# Patient Record
Sex: FEMALE | Race: WHITE | Employment: OTHER | URBAN - METROPOLITAN AREA
[De-identification: names, ages, dates, MRNs, and addresses within clinical notes are randomized per-mention and may not be internally consistent; named-entity substitution may affect disease eponyms.]

---

## 2017-04-09 ENCOUNTER — APPOINTMENT (OUTPATIENT)
Dept: GENERAL RADIOLOGY | Age: 67
End: 2017-04-09
Attending: EMERGENCY MEDICINE
Payer: COMMERCIAL

## 2017-04-09 ENCOUNTER — HOSPITAL ENCOUNTER (EMERGENCY)
Age: 67
Discharge: HOME | End: 2017-04-10
Attending: EMERGENCY MEDICINE | Admitting: EMERGENCY MEDICINE
Payer: COMMERCIAL

## 2017-04-09 ENCOUNTER — APPOINTMENT (OUTPATIENT)
Dept: CT IMAGING | Age: 67
End: 2017-04-09
Attending: EMERGENCY MEDICINE
Payer: COMMERCIAL

## 2017-04-09 DIAGNOSIS — R10.13 ABDOMINAL PAIN, EPIGASTRIC: Primary | ICD-10-CM

## 2017-04-09 DIAGNOSIS — K29.70 GASTRITIS WITHOUT BLEEDING, UNSPECIFIED CHRONICITY, UNSPECIFIED GASTRITIS TYPE: ICD-10-CM

## 2017-04-09 LAB
ALBUMIN SERPL BCP-MCNC: 4.4 G/DL (ref 3.5–5)
ALBUMIN/GLOB SERPL: 1.5 {RATIO} (ref 1.1–2.2)
ALP SERPL-CCNC: 34 U/L (ref 45–117)
ALT SERPL-CCNC: 26 U/L (ref 12–78)
AMYLASE SERPL-CCNC: 126 U/L (ref 25–115)
ANION GAP BLD CALC-SCNC: 6 MMOL/L (ref 5–15)
APTT PPP: 22.9 SEC (ref 22.1–32.5)
AST SERPL W P-5'-P-CCNC: 19 U/L (ref 15–37)
BASOPHILS # BLD AUTO: 0 K/UL (ref 0–0.1)
BASOPHILS # BLD: 0 % (ref 0–1)
BILIRUB SERPL-MCNC: 0.2 MG/DL (ref 0.2–1)
BUN SERPL-MCNC: 17 MG/DL (ref 6–20)
BUN/CREAT SERPL: 23 (ref 12–20)
CALCIUM SERPL-MCNC: 9.1 MG/DL (ref 8.5–10.1)
CHLORIDE SERPL-SCNC: 105 MMOL/L (ref 97–108)
CK MB CFR SERPL CALC: 0.7 % (ref 0–2.5)
CK MB SERPL-MCNC: 1.2 NG/ML (ref 5–25)
CK SERPL-CCNC: 164 U/L (ref 26–192)
CO2 SERPL-SCNC: 26 MMOL/L (ref 21–32)
CREAT SERPL-MCNC: 0.73 MG/DL (ref 0.55–1.02)
D DIMER PPP FEU-MCNC: 0.2 MG/L FEU (ref 0–0.65)
EOSINOPHIL # BLD: 0.1 K/UL (ref 0–0.4)
EOSINOPHIL NFR BLD: 1 % (ref 0–7)
ERYTHROCYTE [DISTWIDTH] IN BLOOD BY AUTOMATED COUNT: 14 % (ref 11.5–14.5)
GLOBULIN SER CALC-MCNC: 2.9 G/DL (ref 2–4)
GLUCOSE SERPL-MCNC: 106 MG/DL (ref 65–100)
HCT VFR BLD AUTO: 34.4 % (ref 35–47)
HGB BLD-MCNC: 11.1 G/DL (ref 11.5–16)
INR PPP: 1 (ref 0.9–1.1)
LIPASE SERPL-CCNC: 272 U/L (ref 73–393)
LYMPHOCYTES # BLD AUTO: 28 % (ref 12–49)
LYMPHOCYTES # BLD: 1.6 K/UL (ref 0.8–3.5)
MCH RBC QN AUTO: 31 PG (ref 26–34)
MCHC RBC AUTO-ENTMCNC: 32.3 G/DL (ref 30–36.5)
MCV RBC AUTO: 96.1 FL (ref 80–99)
MONOCYTES # BLD: 0.3 K/UL (ref 0–1)
MONOCYTES NFR BLD AUTO: 5 % (ref 5–13)
NEUTS SEG # BLD: 3.6 K/UL (ref 1.8–8)
NEUTS SEG NFR BLD AUTO: 66 % (ref 32–75)
PLATELET # BLD AUTO: 314 K/UL (ref 150–400)
POTASSIUM SERPL-SCNC: 4.2 MMOL/L (ref 3.5–5.1)
PROT SERPL-MCNC: 7.3 G/DL (ref 6.4–8.2)
PROTHROMBIN TIME: 10.5 SEC (ref 9–11.1)
RBC # BLD AUTO: 3.58 M/UL (ref 3.8–5.2)
SODIUM SERPL-SCNC: 137 MMOL/L (ref 136–145)
THERAPEUTIC RANGE,PTTT: NORMAL SECS (ref 58–77)
TROPONIN I SERPL-MCNC: <0.04 NG/ML
WBC # BLD AUTO: 5.6 K/UL (ref 3.6–11)

## 2017-04-09 PROCEDURE — 96374 THER/PROPH/DIAG INJ IV PUSH: CPT

## 2017-04-09 PROCEDURE — 80053 COMPREHEN METABOLIC PANEL: CPT | Performed by: EMERGENCY MEDICINE

## 2017-04-09 PROCEDURE — 96361 HYDRATE IV INFUSION ADD-ON: CPT

## 2017-04-09 PROCEDURE — 74011636320 HC RX REV CODE- 636/320: Performed by: EMERGENCY MEDICINE

## 2017-04-09 PROCEDURE — 83690 ASSAY OF LIPASE: CPT | Performed by: EMERGENCY MEDICINE

## 2017-04-09 PROCEDURE — 74011250636 HC RX REV CODE- 250/636: Performed by: EMERGENCY MEDICINE

## 2017-04-09 PROCEDURE — 85379 FIBRIN DEGRADATION QUANT: CPT | Performed by: EMERGENCY MEDICINE

## 2017-04-09 PROCEDURE — 96376 TX/PRO/DX INJ SAME DRUG ADON: CPT

## 2017-04-09 PROCEDURE — 74011000258 HC RX REV CODE- 258: Performed by: EMERGENCY MEDICINE

## 2017-04-09 PROCEDURE — 82550 ASSAY OF CK (CPK): CPT | Performed by: EMERGENCY MEDICINE

## 2017-04-09 PROCEDURE — 85025 COMPLETE CBC W/AUTO DIFF WBC: CPT | Performed by: EMERGENCY MEDICINE

## 2017-04-09 PROCEDURE — 36415 COLL VENOUS BLD VENIPUNCTURE: CPT | Performed by: EMERGENCY MEDICINE

## 2017-04-09 PROCEDURE — 93005 ELECTROCARDIOGRAM TRACING: CPT

## 2017-04-09 PROCEDURE — 84484 ASSAY OF TROPONIN QUANT: CPT | Performed by: EMERGENCY MEDICINE

## 2017-04-09 PROCEDURE — 71010 XR CHEST PORT: CPT

## 2017-04-09 PROCEDURE — 85730 THROMBOPLASTIN TIME PARTIAL: CPT | Performed by: EMERGENCY MEDICINE

## 2017-04-09 PROCEDURE — 85610 PROTHROMBIN TIME: CPT | Performed by: EMERGENCY MEDICINE

## 2017-04-09 PROCEDURE — 96375 TX/PRO/DX INJ NEW DRUG ADDON: CPT

## 2017-04-09 PROCEDURE — 99284 EMERGENCY DEPT VISIT MOD MDM: CPT

## 2017-04-09 PROCEDURE — 74177 CT ABD & PELVIS W/CONTRAST: CPT

## 2017-04-09 PROCEDURE — 82150 ASSAY OF AMYLASE: CPT | Performed by: EMERGENCY MEDICINE

## 2017-04-09 RX ORDER — FOLIC ACID 1 MG/1
4 TABLET ORAL DAILY
COMMUNITY

## 2017-04-09 RX ORDER — ONDANSETRON 2 MG/ML
8 INJECTION INTRAMUSCULAR; INTRAVENOUS
Status: COMPLETED | OUTPATIENT
Start: 2017-04-09 | End: 2017-04-09

## 2017-04-09 RX ORDER — HYDROMORPHONE HYDROCHLORIDE 1 MG/ML
1 INJECTION, SOLUTION INTRAMUSCULAR; INTRAVENOUS; SUBCUTANEOUS
Status: COMPLETED | OUTPATIENT
Start: 2017-04-09 | End: 2017-04-09

## 2017-04-09 RX ORDER — ONDANSETRON 2 MG/ML
4 INJECTION INTRAMUSCULAR; INTRAVENOUS
Status: DISCONTINUED | OUTPATIENT
Start: 2017-04-09 | End: 2017-04-09

## 2017-04-09 RX ORDER — SODIUM CHLORIDE 0.9 % (FLUSH) 0.9 %
10 SYRINGE (ML) INJECTION
Status: COMPLETED | OUTPATIENT
Start: 2017-04-09 | End: 2017-04-09

## 2017-04-09 RX ORDER — KETOROLAC TROMETHAMINE 30 MG/ML
30 INJECTION, SOLUTION INTRAMUSCULAR; INTRAVENOUS
Status: COMPLETED | OUTPATIENT
Start: 2017-04-09 | End: 2017-04-09

## 2017-04-09 RX ORDER — METHOTREXATE 2.5 MG/1
TABLET ORAL
COMMUNITY

## 2017-04-09 RX ORDER — HYDROCODONE BITARTRATE AND ACETAMINOPHEN 5; 325 MG/1; MG/1
1 TABLET ORAL
COMMUNITY
End: 2017-04-10

## 2017-04-09 RX ADMIN — ONDANSETRON 8 MG: 2 INJECTION INTRAMUSCULAR; INTRAVENOUS at 23:03

## 2017-04-09 RX ADMIN — Medication 10 ML: at 23:53

## 2017-04-09 RX ADMIN — SODIUM CHLORIDE 100 ML: 900 INJECTION, SOLUTION INTRAVENOUS at 23:53

## 2017-04-09 RX ADMIN — KETOROLAC TROMETHAMINE 30 MG: 30 INJECTION, SOLUTION INTRAMUSCULAR at 23:05

## 2017-04-09 RX ADMIN — IOPAMIDOL 100 ML: 755 INJECTION, SOLUTION INTRAVENOUS at 23:53

## 2017-04-09 RX ADMIN — HYDROMORPHONE HYDROCHLORIDE 1 MG: 1 INJECTION, SOLUTION INTRAMUSCULAR; INTRAVENOUS; SUBCUTANEOUS at 23:02

## 2017-04-09 RX ADMIN — SODIUM CHLORIDE 1000 ML: 900 INJECTION, SOLUTION INTRAVENOUS at 23:06

## 2017-04-10 ENCOUNTER — APPOINTMENT (OUTPATIENT)
Dept: GENERAL RADIOLOGY | Age: 67
End: 2017-04-10
Attending: INTERNAL MEDICINE
Payer: COMMERCIAL

## 2017-04-10 ENCOUNTER — HOSPITAL ENCOUNTER (OUTPATIENT)
Age: 67
Setting detail: OBSERVATION
Discharge: HOME | End: 2017-04-11
Attending: EMERGENCY MEDICINE | Admitting: FAMILY MEDICINE
Payer: COMMERCIAL

## 2017-04-10 ENCOUNTER — ANESTHESIA (OUTPATIENT)
Dept: ENDOSCOPY | Age: 67
End: 2017-04-10
Payer: COMMERCIAL

## 2017-04-10 ENCOUNTER — SURGERY (OUTPATIENT)
Age: 67
End: 2017-04-10

## 2017-04-10 ENCOUNTER — APPOINTMENT (OUTPATIENT)
Dept: ULTRASOUND IMAGING | Age: 67
End: 2017-04-10
Attending: EMERGENCY MEDICINE
Payer: COMMERCIAL

## 2017-04-10 ENCOUNTER — ANESTHESIA EVENT (OUTPATIENT)
Dept: ENDOSCOPY | Age: 67
End: 2017-04-10
Payer: COMMERCIAL

## 2017-04-10 VITALS
WEIGHT: 120 LBS | OXYGEN SATURATION: 99 % | TEMPERATURE: 97.5 F | BODY MASS INDEX: 20.49 KG/M2 | HEIGHT: 64 IN | HEART RATE: 76 BPM | RESPIRATION RATE: 18 BRPM | DIASTOLIC BLOOD PRESSURE: 83 MMHG | SYSTOLIC BLOOD PRESSURE: 129 MMHG

## 2017-04-10 DIAGNOSIS — R10.13 ABDOMINAL PAIN, EPIGASTRIC: Primary | ICD-10-CM

## 2017-04-10 DIAGNOSIS — K25.9 MULTIPLE GASTRIC ULCERS: ICD-10-CM

## 2017-04-10 LAB
ALBUMIN SERPL BCP-MCNC: 4.6 G/DL (ref 3.5–5)
ALBUMIN/GLOB SERPL: 1.5 {RATIO} (ref 1.1–2.2)
ALP SERPL-CCNC: 32 U/L (ref 45–117)
ALT SERPL-CCNC: 24 U/L (ref 12–78)
ANION GAP BLD CALC-SCNC: 7 MMOL/L (ref 5–15)
APPEARANCE UR: CLEAR
AST SERPL W P-5'-P-CCNC: 17 U/L (ref 15–37)
ATRIAL RATE: 61 BPM
ATRIAL RATE: 68 BPM
ATRIAL RATE: 70 BPM
BACTERIA URNS QL MICRO: NEGATIVE /HPF
BASOPHILS # BLD AUTO: 0 K/UL (ref 0–0.1)
BASOPHILS # BLD: 0 % (ref 0–1)
BILIRUB SERPL-MCNC: 0.4 MG/DL (ref 0.2–1)
BILIRUB UR QL: NEGATIVE
BUN SERPL-MCNC: 13 MG/DL (ref 6–20)
BUN/CREAT SERPL: 19 (ref 12–20)
CALCIUM SERPL-MCNC: 9.3 MG/DL (ref 8.5–10.1)
CALCULATED P AXIS, ECG09: 45 DEGREES
CALCULATED P AXIS, ECG09: 52 DEGREES
CALCULATED P AXIS, ECG09: 66 DEGREES
CALCULATED R AXIS, ECG10: 30 DEGREES
CALCULATED R AXIS, ECG10: 44 DEGREES
CALCULATED R AXIS, ECG10: 48 DEGREES
CALCULATED T AXIS, ECG11: 73 DEGREES
CALCULATED T AXIS, ECG11: 82 DEGREES
CALCULATED T AXIS, ECG11: 94 DEGREES
CHLORIDE SERPL-SCNC: 104 MMOL/L (ref 97–108)
CO2 SERPL-SCNC: 25 MMOL/L (ref 21–32)
COLOR UR: ABNORMAL
CREAT SERPL-MCNC: 0.67 MG/DL (ref 0.55–1.02)
DIAGNOSIS, 93000: NORMAL
DIFFERENTIAL METHOD BLD: ABNORMAL
EOSINOPHIL # BLD: 0 K/UL (ref 0–0.4)
EOSINOPHIL NFR BLD: 0 % (ref 0–7)
EPITH CASTS URNS QL MICRO: ABNORMAL /LPF
ERYTHROCYTE [DISTWIDTH] IN BLOOD BY AUTOMATED COUNT: 14 % (ref 11.5–14.5)
GLOBULIN SER CALC-MCNC: 3 G/DL (ref 2–4)
GLUCOSE SERPL-MCNC: 112 MG/DL (ref 65–100)
GLUCOSE UR STRIP.AUTO-MCNC: NEGATIVE MG/DL
HCT VFR BLD AUTO: 34.1 % (ref 35–47)
HGB BLD-MCNC: 11.1 G/DL (ref 11.5–16)
HGB UR QL STRIP: ABNORMAL
HYALINE CASTS URNS QL MICRO: ABNORMAL /LPF (ref 0–5)
KETONES UR QL STRIP.AUTO: ABNORMAL MG/DL
LEUKOCYTE ESTERASE UR QL STRIP.AUTO: NEGATIVE
LIPASE SERPL-CCNC: 116 U/L (ref 73–393)
LYMPHOCYTES # BLD AUTO: 9 % (ref 12–49)
LYMPHOCYTES # BLD: 0.6 K/UL (ref 0.8–3.5)
MCH RBC QN AUTO: 30.8 PG (ref 26–34)
MCHC RBC AUTO-ENTMCNC: 32.6 G/DL (ref 30–36.5)
MCV RBC AUTO: 94.7 FL (ref 80–99)
MONOCYTES # BLD: 0.5 K/UL (ref 0–1)
MONOCYTES NFR BLD AUTO: 7 % (ref 5–13)
NEUTS SEG # BLD: 5.7 K/UL (ref 1.8–8)
NEUTS SEG NFR BLD AUTO: 84 % (ref 32–75)
NITRITE UR QL STRIP.AUTO: NEGATIVE
P-R INTERVAL, ECG05: 122 MS
P-R INTERVAL, ECG05: 140 MS
P-R INTERVAL, ECG05: 146 MS
PH UR STRIP: 7.5 [PH] (ref 5–8)
PLATELET # BLD AUTO: 310 K/UL (ref 150–400)
POTASSIUM SERPL-SCNC: 4.1 MMOL/L (ref 3.5–5.1)
PROT SERPL-MCNC: 7.6 G/DL (ref 6.4–8.2)
PROT UR STRIP-MCNC: NEGATIVE MG/DL
Q-T INTERVAL, ECG07: 428 MS
Q-T INTERVAL, ECG07: 444 MS
Q-T INTERVAL, ECG07: 466 MS
QRS DURATION, ECG06: 100 MS
QRS DURATION, ECG06: 90 MS
QRS DURATION, ECG06: 94 MS
QTC CALCULATION (BEZET), ECG08: 462 MS
QTC CALCULATION (BEZET), ECG08: 469 MS
QTC CALCULATION (BEZET), ECG08: 472 MS
RBC # BLD AUTO: 3.6 M/UL (ref 3.8–5.2)
RBC #/AREA URNS HPF: ABNORMAL /HPF (ref 0–5)
RBC MORPH BLD: ABNORMAL
RBC MORPH BLD: ABNORMAL
SODIUM SERPL-SCNC: 136 MMOL/L (ref 136–145)
SP GR UR REFRACTOMETRY: 1.03 (ref 1–1.03)
TROPONIN I BLD-MCNC: <0.04 NG/ML (ref 0–0.08)
UROBILINOGEN UR QL STRIP.AUTO: 0.2 EU/DL (ref 0.2–1)
VENTRICULAR RATE, ECG03: 61 BPM
VENTRICULAR RATE, ECG03: 68 BPM
VENTRICULAR RATE, ECG03: 70 BPM
WBC # BLD AUTO: 6.8 K/UL (ref 3.6–11)
WBC URNS QL MICRO: ABNORMAL /HPF (ref 0–4)

## 2017-04-10 PROCEDURE — 74011000250 HC RX REV CODE- 250

## 2017-04-10 PROCEDURE — 74011250636 HC RX REV CODE- 250/636: Performed by: EMERGENCY MEDICINE

## 2017-04-10 PROCEDURE — 76705 ECHO EXAM OF ABDOMEN: CPT

## 2017-04-10 PROCEDURE — 96374 THER/PROPH/DIAG INJ IV PUSH: CPT

## 2017-04-10 PROCEDURE — 83690 ASSAY OF LIPASE: CPT | Performed by: EMERGENCY MEDICINE

## 2017-04-10 PROCEDURE — 99218 HC RM OBSERVATION: CPT

## 2017-04-10 PROCEDURE — 74011000250 HC RX REV CODE- 250: Performed by: NURSE PRACTITIONER

## 2017-04-10 PROCEDURE — 88305 TISSUE EXAM BY PATHOLOGIST: CPT | Performed by: INTERNAL MEDICINE

## 2017-04-10 PROCEDURE — 93005 ELECTROCARDIOGRAM TRACING: CPT

## 2017-04-10 PROCEDURE — 74011250637 HC RX REV CODE- 250/637: Performed by: NURSE PRACTITIONER

## 2017-04-10 PROCEDURE — 74000 XR ABD (KUB): CPT

## 2017-04-10 PROCEDURE — 74011250636 HC RX REV CODE- 250/636: Performed by: NURSE PRACTITIONER

## 2017-04-10 PROCEDURE — 76060000031 HC ANESTHESIA FIRST 0.5 HR: Performed by: INTERNAL MEDICINE

## 2017-04-10 PROCEDURE — C9113 INJ PANTOPRAZOLE SODIUM, VIA: HCPCS | Performed by: EMERGENCY MEDICINE

## 2017-04-10 PROCEDURE — 80053 COMPREHEN METABOLIC PANEL: CPT | Performed by: EMERGENCY MEDICINE

## 2017-04-10 PROCEDURE — 99284 EMERGENCY DEPT VISIT MOD MDM: CPT

## 2017-04-10 PROCEDURE — C9113 INJ PANTOPRAZOLE SODIUM, VIA: HCPCS | Performed by: NURSE PRACTITIONER

## 2017-04-10 PROCEDURE — 85025 COMPLETE CBC W/AUTO DIFF WBC: CPT | Performed by: EMERGENCY MEDICINE

## 2017-04-10 PROCEDURE — 84484 ASSAY OF TROPONIN QUANT: CPT

## 2017-04-10 PROCEDURE — 96376 TX/PRO/DX INJ SAME DRUG ADON: CPT

## 2017-04-10 PROCEDURE — 96375 TX/PRO/DX INJ NEW DRUG ADDON: CPT

## 2017-04-10 PROCEDURE — 74011000250 HC RX REV CODE- 250: Performed by: EMERGENCY MEDICINE

## 2017-04-10 PROCEDURE — 96361 HYDRATE IV INFUSION ADD-ON: CPT

## 2017-04-10 PROCEDURE — 77030009426 HC FCPS BIOP ENDOSC BSC -B: Performed by: INTERNAL MEDICINE

## 2017-04-10 PROCEDURE — 74011250636 HC RX REV CODE- 250/636

## 2017-04-10 PROCEDURE — 81001 URINALYSIS AUTO W/SCOPE: CPT | Performed by: EMERGENCY MEDICINE

## 2017-04-10 PROCEDURE — 76040000019: Performed by: INTERNAL MEDICINE

## 2017-04-10 PROCEDURE — 74011250637 HC RX REV CODE- 250/637: Performed by: EMERGENCY MEDICINE

## 2017-04-10 PROCEDURE — 36415 COLL VENOUS BLD VENIPUNCTURE: CPT | Performed by: EMERGENCY MEDICINE

## 2017-04-10 RX ORDER — SODIUM CHLORIDE 9 MG/ML
50 INJECTION, SOLUTION INTRAVENOUS CONTINUOUS
Status: DISCONTINUED | OUTPATIENT
Start: 2017-04-10 | End: 2017-04-10

## 2017-04-10 RX ORDER — ATROPINE SULFATE 0.1 MG/ML
0.5 INJECTION INTRAVENOUS
Status: DISCONTINUED | OUTPATIENT
Start: 2017-04-10 | End: 2017-04-10 | Stop reason: HOSPADM

## 2017-04-10 RX ORDER — FOLIC ACID 1 MG/1
4 TABLET ORAL DAILY
Status: DISCONTINUED | OUTPATIENT
Start: 2017-04-11 | End: 2017-04-11 | Stop reason: HOSPADM

## 2017-04-10 RX ORDER — LIDOCAINE HYDROCHLORIDE 20 MG/ML
INJECTION, SOLUTION EPIDURAL; INFILTRATION; INTRACAUDAL; PERINEURAL AS NEEDED
Status: DISCONTINUED | OUTPATIENT
Start: 2017-04-10 | End: 2017-04-10 | Stop reason: HOSPADM

## 2017-04-10 RX ORDER — CARBOXYMETHYLCELLULOSE SODIUM 5 MG/ML
1 SOLUTION/ DROPS OPHTHALMIC AS NEEDED
COMMUNITY

## 2017-04-10 RX ORDER — SODIUM CHLORIDE 0.9 % (FLUSH) 0.9 %
5-10 SYRINGE (ML) INJECTION AS NEEDED
Status: DISPENSED | OUTPATIENT
Start: 2017-04-10 | End: 2017-04-10

## 2017-04-10 RX ORDER — SODIUM CHLORIDE 0.9 % (FLUSH) 0.9 %
5-10 SYRINGE (ML) INJECTION AS NEEDED
Status: DISCONTINUED | OUTPATIENT
Start: 2017-04-10 | End: 2017-04-11 | Stop reason: HOSPADM

## 2017-04-10 RX ORDER — HYDROMORPHONE HYDROCHLORIDE 1 MG/ML
INJECTION, SOLUTION INTRAMUSCULAR; INTRAVENOUS; SUBCUTANEOUS
Status: COMPLETED
Start: 2017-04-10 | End: 2017-04-10

## 2017-04-10 RX ORDER — BUTALBITAL, ACETAMINOPHEN, CAFFEINE AND CODEINE PHOSPHATE 50; 325; 40; 30 MG/1; MG/1; MG/1; MG/1
1-2 CAPSULE ORAL
COMMUNITY

## 2017-04-10 RX ORDER — SUCRALFATE 1 G/1
1 TABLET ORAL
Status: DISCONTINUED | OUTPATIENT
Start: 2017-04-10 | End: 2017-04-11 | Stop reason: HOSPADM

## 2017-04-10 RX ORDER — HYDROMORPHONE HYDROCHLORIDE 1 MG/ML
1 INJECTION, SOLUTION INTRAMUSCULAR; INTRAVENOUS; SUBCUTANEOUS
Status: COMPLETED | OUTPATIENT
Start: 2017-04-10 | End: 2017-04-10

## 2017-04-10 RX ORDER — NALOXONE HYDROCHLORIDE 0.4 MG/ML
0.4 INJECTION, SOLUTION INTRAMUSCULAR; INTRAVENOUS; SUBCUTANEOUS
Status: DISCONTINUED | OUTPATIENT
Start: 2017-04-10 | End: 2017-04-10 | Stop reason: HOSPADM

## 2017-04-10 RX ORDER — HYDROMORPHONE HYDROCHLORIDE 5 MG/5ML
1 SOLUTION ORAL
Status: DISCONTINUED | OUTPATIENT
Start: 2017-04-10 | End: 2017-04-10

## 2017-04-10 RX ORDER — ETODOLAC 500 MG/1
500 TABLET, FILM COATED ORAL 2 TIMES DAILY
COMMUNITY
End: 2017-04-11

## 2017-04-10 RX ORDER — MIDAZOLAM HYDROCHLORIDE 1 MG/ML
.25-5 INJECTION, SOLUTION INTRAMUSCULAR; INTRAVENOUS
Status: DISCONTINUED | OUTPATIENT
Start: 2017-04-10 | End: 2017-04-10 | Stop reason: HOSPADM

## 2017-04-10 RX ORDER — EPINEPHRINE 0.1 MG/ML
1 INJECTION INTRACARDIAC; INTRAVENOUS
Status: DISCONTINUED | OUTPATIENT
Start: 2017-04-10 | End: 2017-04-10 | Stop reason: HOSPADM

## 2017-04-10 RX ORDER — FENTANYL CITRATE 50 UG/ML
100 INJECTION, SOLUTION INTRAMUSCULAR; INTRAVENOUS
Status: DISCONTINUED | OUTPATIENT
Start: 2017-04-10 | End: 2017-04-10 | Stop reason: HOSPADM

## 2017-04-10 RX ORDER — CETIRIZINE HCL 10 MG
10 TABLET ORAL
COMMUNITY

## 2017-04-10 RX ORDER — OXYCODONE AND ACETAMINOPHEN 7.5; 325 MG/1; MG/1
1 TABLET ORAL
COMMUNITY

## 2017-04-10 RX ORDER — PROPOFOL 10 MG/ML
INJECTION, EMULSION INTRAVENOUS AS NEEDED
Status: DISCONTINUED | OUTPATIENT
Start: 2017-04-10 | End: 2017-04-10 | Stop reason: HOSPADM

## 2017-04-10 RX ORDER — SODIUM CHLORIDE 0.9 % (FLUSH) 0.9 %
5-10 SYRINGE (ML) INJECTION EVERY 8 HOURS
Status: DISCONTINUED | OUTPATIENT
Start: 2017-04-10 | End: 2017-04-10

## 2017-04-10 RX ORDER — CYCLOBENZAPRINE HCL 10 MG
10 TABLET ORAL 2 TIMES DAILY
Status: DISCONTINUED | OUTPATIENT
Start: 2017-04-10 | End: 2017-04-11 | Stop reason: HOSPADM

## 2017-04-10 RX ORDER — ONDANSETRON 2 MG/ML
4 INJECTION INTRAMUSCULAR; INTRAVENOUS
Status: DISCONTINUED | OUTPATIENT
Start: 2017-04-10 | End: 2017-04-11

## 2017-04-10 RX ORDER — SODIUM CHLORIDE 9 MG/ML
INJECTION, SOLUTION INTRAVENOUS
Status: DISCONTINUED | OUTPATIENT
Start: 2017-04-10 | End: 2017-04-10 | Stop reason: HOSPADM

## 2017-04-10 RX ORDER — FLUOROURACIL 50 MG/G
CREAM TOPICAL 2 TIMES DAILY
COMMUNITY

## 2017-04-10 RX ORDER — DEXTROMETHORPHAN/PSEUDOEPHED 2.5-7.5/.8
1.2 DROPS ORAL
Status: DISCONTINUED | OUTPATIENT
Start: 2017-04-10 | End: 2017-04-10 | Stop reason: HOSPADM

## 2017-04-10 RX ORDER — HYDROMORPHONE HYDROCHLORIDE 1 MG/ML
1 INJECTION, SOLUTION INTRAMUSCULAR; INTRAVENOUS; SUBCUTANEOUS ONCE
Status: COMPLETED | OUTPATIENT
Start: 2017-04-10 | End: 2017-04-10

## 2017-04-10 RX ORDER — SODIUM CHLORIDE 0.9 % (FLUSH) 0.9 %
5-10 SYRINGE (ML) INJECTION EVERY 8 HOURS
Status: DISCONTINUED | OUTPATIENT
Start: 2017-04-10 | End: 2017-04-11 | Stop reason: HOSPADM

## 2017-04-10 RX ORDER — CYCLOBENZAPRINE HCL 10 MG
10 TABLET ORAL 2 TIMES DAILY
COMMUNITY

## 2017-04-10 RX ORDER — ACETAMINOPHEN 500 MG
500 TABLET ORAL
Status: DISCONTINUED | OUTPATIENT
Start: 2017-04-10 | End: 2017-04-11 | Stop reason: HOSPADM

## 2017-04-10 RX ORDER — OXYCODONE AND ACETAMINOPHEN 5; 325 MG/1; MG/1
2 TABLET ORAL
Qty: 10 TAB | Refills: 0 | Status: SHIPPED | OUTPATIENT
Start: 2017-04-10 | End: 2017-04-10

## 2017-04-10 RX ORDER — ACETAMINOPHEN 500 MG
500 TABLET ORAL
COMMUNITY

## 2017-04-10 RX ORDER — PANTOPRAZOLE SODIUM 40 MG/10ML
40 INJECTION, POWDER, LYOPHILIZED, FOR SOLUTION INTRAVENOUS
Status: COMPLETED | OUTPATIENT
Start: 2017-04-10 | End: 2017-04-10

## 2017-04-10 RX ORDER — OMEPRAZOLE 20 MG/1
20 CAPSULE, DELAYED RELEASE ORAL DAILY
Qty: 30 CAP | Refills: 0 | Status: SHIPPED | OUTPATIENT
Start: 2017-04-10 | End: 2017-04-10

## 2017-04-10 RX ORDER — CETIRIZINE HCL 10 MG
10 TABLET ORAL
Status: DISCONTINUED | OUTPATIENT
Start: 2017-04-10 | End: 2017-04-11 | Stop reason: HOSPADM

## 2017-04-10 RX ORDER — ONDANSETRON 8 MG/1
8 TABLET, ORALLY DISINTEGRATING ORAL
Qty: 15 TAB | Refills: 0 | Status: SHIPPED | OUTPATIENT
Start: 2017-04-10 | End: 2017-04-10

## 2017-04-10 RX ORDER — HYDROMORPHONE HYDROCHLORIDE 1 MG/ML
1 INJECTION, SOLUTION INTRAMUSCULAR; INTRAVENOUS; SUBCUTANEOUS
Status: DISCONTINUED | OUTPATIENT
Start: 2017-04-10 | End: 2017-04-11

## 2017-04-10 RX ORDER — BUTALBITAL, ACETAMINOPHEN AND CAFFEINE 50; 325; 40 MG/1; MG/1; MG/1
1 TABLET ORAL
Status: DISCONTINUED | OUTPATIENT
Start: 2017-04-10 | End: 2017-04-11 | Stop reason: HOSPADM

## 2017-04-10 RX ORDER — HYDROMORPHONE HYDROCHLORIDE 1 MG/ML
1 INJECTION, SOLUTION INTRAMUSCULAR; INTRAVENOUS; SUBCUTANEOUS
Status: DISCONTINUED | OUTPATIENT
Start: 2017-04-10 | End: 2017-04-10

## 2017-04-10 RX ORDER — ONDANSETRON 2 MG/ML
4 INJECTION INTRAMUSCULAR; INTRAVENOUS
Status: COMPLETED | OUTPATIENT
Start: 2017-04-10 | End: 2017-04-10

## 2017-04-10 RX ORDER — SODIUM CHLORIDE 9 MG/ML
125 INJECTION, SOLUTION INTRAVENOUS CONTINUOUS
Status: DISCONTINUED | OUTPATIENT
Start: 2017-04-10 | End: 2017-04-11

## 2017-04-10 RX ORDER — FLUMAZENIL 0.1 MG/ML
0.2 INJECTION INTRAVENOUS
Status: DISCONTINUED | OUTPATIENT
Start: 2017-04-10 | End: 2017-04-10 | Stop reason: HOSPADM

## 2017-04-10 RX ORDER — LORAZEPAM 1 MG/1
1-2 TABLET ORAL
Status: DISCONTINUED | OUTPATIENT
Start: 2017-04-10 | End: 2017-04-11 | Stop reason: HOSPADM

## 2017-04-10 RX ORDER — LORAZEPAM 1 MG/1
1-2 TABLET ORAL
COMMUNITY

## 2017-04-10 RX ADMIN — LIDOCAINE HYDROCHLORIDE 40 MG: 20 INJECTION, SOLUTION EPIDURAL; INFILTRATION; INTRACAUDAL; PERINEURAL at 13:37

## 2017-04-10 RX ADMIN — HYDROMORPHONE HYDROCHLORIDE 1 MG: 1 INJECTION, SOLUTION INTRAMUSCULAR; INTRAVENOUS; SUBCUTANEOUS at 12:39

## 2017-04-10 RX ADMIN — HYDROMORPHONE HYDROCHLORIDE 1 MG: 1 INJECTION, SOLUTION INTRAMUSCULAR; INTRAVENOUS; SUBCUTANEOUS at 15:19

## 2017-04-10 RX ADMIN — PROPOFOL 50 MG: 10 INJECTION, EMULSION INTRAVENOUS at 13:43

## 2017-04-10 RX ADMIN — Medication 1 CAPSULE: at 21:14

## 2017-04-10 RX ADMIN — HYDROMORPHONE HYDROCHLORIDE 1 MG: 1 INJECTION, SOLUTION INTRAMUSCULAR; INTRAVENOUS; SUBCUTANEOUS at 01:50

## 2017-04-10 RX ADMIN — Medication 10 ML: at 21:21

## 2017-04-10 RX ADMIN — SUCRALFATE 1 G: 1 TABLET ORAL at 21:14

## 2017-04-10 RX ADMIN — SODIUM CHLORIDE 125 ML/HR: 900 INJECTION, SOLUTION INTRAVENOUS at 18:00

## 2017-04-10 RX ADMIN — SODIUM CHLORIDE 40 MG: 9 INJECTION INTRAMUSCULAR; INTRAVENOUS; SUBCUTANEOUS at 21:14

## 2017-04-10 RX ADMIN — LIDOCAINE HYDROCHLORIDE 40 ML: 20 SOLUTION ORAL; TOPICAL at 01:17

## 2017-04-10 RX ADMIN — HYDROMORPHONE HYDROCHLORIDE 1 MG: 1 INJECTION, SOLUTION INTRAMUSCULAR; INTRAVENOUS; SUBCUTANEOUS at 20:36

## 2017-04-10 RX ADMIN — SODIUM CHLORIDE 1000 ML: 900 INJECTION, SOLUTION INTRAVENOUS at 12:40

## 2017-04-10 RX ADMIN — PANTOPRAZOLE SODIUM 40 MG: 40 INJECTION, POWDER, FOR SOLUTION INTRAVENOUS at 15:15

## 2017-04-10 RX ADMIN — LORAZEPAM 1 MG: 1 TABLET ORAL at 21:14

## 2017-04-10 RX ADMIN — PROPOFOL 50 MG: 10 INJECTION, EMULSION INTRAVENOUS at 13:40

## 2017-04-10 RX ADMIN — ONDANSETRON 4 MG: 2 INJECTION INTRAMUSCULAR; INTRAVENOUS at 12:33

## 2017-04-10 RX ADMIN — PROPOFOL 50 MG: 10 INJECTION, EMULSION INTRAVENOUS at 13:37

## 2017-04-10 RX ADMIN — SODIUM CHLORIDE: 9 INJECTION, SOLUTION INTRAVENOUS at 13:27

## 2017-04-10 RX ADMIN — Medication 10 ML: at 18:01

## 2017-04-10 RX ADMIN — ONDANSETRON 4 MG: 2 INJECTION INTRAMUSCULAR; INTRAVENOUS at 15:15

## 2017-04-10 NOTE — ED NOTES
Discharge instructions given to pt. All questions answered and pt verbalized understanding. V/S stable @ time of discharge. Pt out of unit via wheelchair.

## 2017-04-10 NOTE — DISCHARGE INSTRUCTIONS
We hope that we have addressed all of your medical concerns. The examination and treatment you received in the Emergency Department were for an emergent problem and were not intended as complete care. It is important that you follow up with your healthcare provider(s) for ongoing care. If your symptoms worsen or do not improve as expected, and you are unable to reach your usual health care provider(s), you should return to the Emergency Department. Today's healthcare is undergoing tremendous change, and patient satisfaction surveys are one of the many tools to assess the quality of medical care. You may receive a survey from the [employer] regarding your experience in the Emergency Department. I hope that your experience has been completely positive, particularly the medical care that I provided. As such, please participate in the survey; anything less than excellent does not meet my expectations or intentions. [facility] and [address] [place] [address] participate in nationally recognized quality of care measures. If your blood pressure is greater than 120/80, as reported below, we urge that you seek medical care to address the potential of high blood pressure, commonly known as hypertension. Hypertension can be hereditary or can be caused by certain medical conditions, pain, stress, or \"white coat syndrome. \"       Please make an appointment with your health care provider(s) for follow up of your Emergency Department visit. VITALS:   Patient Vitals for the past 8 hrs:   Temp Pulse Resp BP SpO2   04/10/17 0120 - 78 18 153/72 100 %   04/09/17 2321 - 63 20 (!) 152/109 100 %   04/09/17 2205 97.7 °F (36.5 °C) 76 18 (!) 159/94 99 %          Thank you for allowing us to provide you with medical care today. We realize that you have many choices for your emergency care needs. Please choose us in the future for any continued health care needs. Brijesh Quiroz MD    [address] [facility].   Office: [phone]            Recent Results (from the past 24 hour(s))   CBC WITH AUTOMATED DIFF    Collection Time: 04/09/17 10:27 PM   Result Value Ref Range    WBC 5.6 3.6 - 11.0 K/uL    RBC 3.58 (L) 3.80 - 5.20 M/uL    HGB 11.1 (L) 11.5 - 16.0 g/dL    HCT 34.4 (L) 35.0 - 47.0 %    MCV 96.1 80.0 - 99.0 FL    MCH 31.0 26.0 - 34.0 PG    MCHC 32.3 30.0 - 36.5 g/dL    RDW 14.0 11.5 - 14.5 %    PLATELET 374 397 - 201 K/uL    NEUTROPHILS 66 32 - 75 %    LYMPHOCYTES 28 12 - 49 %    MONOCYTES 5 5 - 13 %    EOSINOPHILS 1 0 - 7 %    BASOPHILS 0 0 - 1 %    ABS. NEUTROPHILS 3.6 1.8 - 8.0 K/UL    ABS. LYMPHOCYTES 1.6 0.8 - 3.5 K/UL    ABS. MONOCYTES 0.3 0.0 - 1.0 K/UL    ABS. EOSINOPHILS 0.1 0.0 - 0.4 K/UL    ABS. BASOPHILS 0.0 0.0 - 0.1 K/UL   METABOLIC PANEL, COMPREHENSIVE    Collection Time: 04/09/17 10:27 PM   Result Value Ref Range    Sodium 137 136 - 145 mmol/L    Potassium 4.2 3.5 - 5.1 mmol/L    Chloride 105 97 - 108 mmol/L    CO2 26 21 - 32 mmol/L    Anion gap 6 5 - 15 mmol/L    Glucose 106 (H) 65 - 100 mg/dL    BUN 17 6 - 20 MG/DL    Creatinine 0.73 0.55 - 1.02 MG/DL    BUN/Creatinine ratio 23 (H) 12 - 20      GFR est AA >60 >60 ml/min/1.73m2    GFR est non-AA >60 >60 ml/min/1.73m2    Calcium 9.1 8.5 - 10.1 MG/DL    Bilirubin, total 0.2 0.2 - 1.0 MG/DL    ALT (SGPT) 26 12 - 78 U/L    AST (SGOT) 19 15 - 37 U/L    Alk.  phosphatase 34 (L) 45 - 117 U/L    Protein, total 7.3 6.4 - 8.2 g/dL    Albumin 4.4 3.5 - 5.0 g/dL    Globulin 2.9 2.0 - 4.0 g/dL    A-G Ratio 1.5 1.1 - 2.2     LIPASE    Collection Time: 04/09/17 10:27 PM   Result Value Ref Range    Lipase 272 73 - 393 U/L   AMYLASE    Collection Time: 04/09/17 10:27 PM   Result Value Ref Range    Amylase 126 (H) 25 - 115 U/L   CK W/ CKMB & INDEX    Collection Time: 04/09/17 10:27 PM   Result Value Ref Range     26 - 192 U/L    CK - MB 1.2 <3.6 NG/ML    CK-MB Index 0.7 0 - 2.5 D DIMER    Collection Time: 04/09/17 10:27 PM   Result Value Ref Range    D-dimer 0.20 0.00 - 0.65 mg/L FEU   PROTHROMBIN TIME + INR    Collection Time: 04/09/17 10:27 PM   Result Value Ref Range    INR 1.0 0.9 - 1.1      Prothrombin time 10.5 9.0 - 11.1 sec   PTT    Collection Time: 04/09/17 10:27 PM   Result Value Ref Range    aPTT 22.9 22.1 - 32.5 sec    aPTT, therapeutic range     58.0 - 77.0 SECS   TROPONIN I    Collection Time: 04/09/17 10:27 PM   Result Value Ref Range    Troponin-I, Qt. <0.04 <0.05 ng/mL   EKG, 12 LEAD, INITIAL    Collection Time: 04/09/17 11:02 PM   Result Value Ref Range    Ventricular Rate 61 BPM    Atrial Rate 61 BPM    P-R Interval 122 ms    QRS Duration 94 ms    Q-T Interval 466 ms    QTC Calculation (Bezet) 469 ms    Calculated P Axis 66 degrees    Calculated R Axis 48 degrees    Calculated T Axis 82 degrees    Diagnosis       Normal sinus rhythm with sinus arrhythmia  ST & T wave abnormality, consider anterolateral ischemia  No previous ECGs available     URINALYSIS W/ RFLX MICROSCOPIC    Collection Time: 04/10/17  1:22 AM   Result Value Ref Range    Color YELLOW/STRAW      Appearance CLEAR CLEAR      Specific gravity 1.028 1.003 - 1.030      pH (UA) 7.5 5.0 - 8.0      Protein NEGATIVE  NEG mg/dL    Glucose NEGATIVE  NEG mg/dL    Ketone TRACE (A) NEG mg/dL    Bilirubin NEGATIVE  NEG      Blood MODERATE (A) NEG      Urobilinogen 0.2 0.2 - 1.0 EU/dL    Nitrites NEGATIVE  NEG      Leukocyte Esterase NEGATIVE  NEG      WBC 0-4 0 - 4 /hpf    RBC 20-50 0 - 5 /hpf    Epithelial cells FEW FEW /lpf    Bacteria NEGATIVE  NEG /hpf    Hyaline cast 0-2 0 - 5 /lpf   EKG, 12 LEAD, INITIAL    Collection Time: 04/10/17  1:45 AM   Result Value Ref Range    Ventricular Rate 68 BPM    Atrial Rate 68 BPM    P-R Interval 140 ms    QRS Duration 90 ms    Q-T Interval 444 ms    QTC Calculation (Bezet) 472 ms    Calculated P Axis 52 degrees    Calculated R Axis 44 degrees    Calculated T Axis 73 degrees Diagnosis       Normal sinus rhythm  ST & T wave abnormality, consider anterior ischemia  When compared with ECG of 09-APR-2017 23:02,  MANUAL COMPARISON REQUIRED, DATA IS UNCONFIRMED     POC TROPONIN-I    Collection Time: 04/10/17  2:02 AM   Result Value Ref Range    Troponin-I (POC) <0.04 0.00 - 0.08 ng/mL       Ct Abd Pelv W Cont    Result Date: 4/10/2017  EXAM:  CT abdomen pelvis with contrast INDICATION: Upper abdominal pain for 2 hours. COMPARISON: CT 8/1/2007. TECHNIQUE: Helical CT of the abdomen  and pelvis  following the uneventful intravenous administration of nonionic contrast.  Coronal and sagittal reformats are performed. CT dose reduction was achieved through use of a standardized protocol tailored for this examination and automatic exposure control for dose modulation. Adaptive statistical iterative reconstruction (ASIR) was utilized. FINDINGS: The visualized lung bases demonstrate no mass or consolidation. There is a stable benign 3 mm nodule in the left lower lobe. The heart size is normal. There is no pericardial or pleural effusion. The liver, spleen, pancreas, and adrenal glands are normal. The kidneys are symmetric without hydronephrosis. The gall bladder is present  without intra- or extra-hepatic biliary dilatation. There is distal gastric wall thickening with mild adjacent fluid and fat stranding. There are multiple fluid-filled but nondilated small bowel loops. There are no dilated bowel loops. The appendix is not well seen. There are no enlarged lymph nodes. There is no free air. The aorta is tortuous but tapers without aneurysm. The urinary bladder is normal.  There is no pelvic mass. The bony structures are age-appropriate. IMPRESSION: Findings suggestive of gastroenteritis likely attributable to nonspecific infection or inflammation. There is no bowel obstruction. [facility]    Result Date: 4/10/2017  EXAM:  US ABD LTD INDICATION: Epigastric pain.  COMPARISON:  CT 4/9/2017 TECHNIQUE:  Limited abdominal ultrasound. FINDINGS: Liver: Echogenicity is within normal limits. No focal liver lesion. Main portal vein flow: Toward the liver. Fluid: No ascites. Gallbladder: There are several adherent nonshadowing structures along the gallbladder wall measuring up to 3 mm. No gallbladder wall thickening or pericholecystic fluid. Negative sonographic Lynn sign. Bile ducts: There is no intra or extrahepatic biliary ductal dilatation. The common bile duct measures 6 mm. Pancreas: The visualized portions are within normal limits. Kidneys: Right length: 10.3 cm. No hydronephrosis. IMPRESSION: 1. Findings of several adherent gallstones versus polyps measuring up to 3 mm. There is no biliary duct dilatation. 2. No other acute abnormality is seen in the right upper abdomen. Xr Chest Port    Result Date: 4/9/2017  EXAM:  XR CHEST PORT INDICATION:  Abdominal pain for 2 hours. Chest pain. COMPARISON: None TECHNIQUE: Portable AP upright chest view at 2321 hours FINDINGS: Cardiac monitoring wires overlie the thorax. The cardiomediastinal and hilar contours are within normal limits. The pulmonary vasculature is within normal limits. The lungs and pleural spaces are clear. There is levoconvex curvature of the thoracic spine. The upper abdomen is unremarkable. IMPRESSION: No acute process. Abdominal Pain: Care Instructions  Your Care Instructions    Abdominal pain has many possible causes. Some aren't serious and get better on their own in a few days. Others need more testing and treatment. If your pain continues or gets worse, you need to be rechecked and may need more tests to find out what is wrong. You may need surgery to correct the problem. Don't ignore new symptoms, such as fever, nausea and vomiting, urination problems, pain that gets worse, and dizziness. These may be signs of a more serious problem. Your doctor may have recommended a follow-up visit in the next 8 to 12 hours. If you are not getting better, you may need more tests or treatment. The doctor has checked you carefully, but problems can develop later. If you notice any problems or new symptoms, get medical treatment right away. Follow-up care is a key part of your treatment and safety. Be sure to make and go to all appointments, and call your doctor if you are having problems. It's also a good idea to know your test results and keep a list of the medicines you take. How can you care for yourself at home? · Rest until you feel better. · To prevent dehydration, drink plenty of fluids, enough so that your urine is light yellow or clear like water. Choose water and other caffeine-free clear liquids until you feel better. If you have kidney, heart, or liver disease and have to limit fluids, talk with your doctor before you increase the amount of fluids you drink. · If your stomach is upset, eat mild foods, such as rice, dry toast or crackers, bananas, and applesauce. Try eating several small meals instead of two or three large ones. · Wait until 48 hours after all symptoms have gone away before you have spicy foods, alcohol, and drinks that contain caffeine. · Do not eat foods that are high in fat. · Avoid anti-inflammatory medicines such as aspirin, ibuprofen (Advil, Motrin), and naproxen (Aleve). These can cause stomach upset. Talk to your doctor if you take daily aspirin for another health problem. When should you call for help? Call 911 anytime you think you may need emergency care. For example, call if:  · You passed out (lost consciousness). · You pass maroon or very bloody stools. · You vomit blood or what looks like coffee grounds. · You have new, severe belly pain. Call your doctor now or seek immediate medical care if:  · Your pain gets worse, especially if it becomes focused in one area of your belly. · You have a new or higher fever.   · Your stools are black and look like tar, or they have streaks of blood.  · You have unexpected vaginal bleeding. · You have symptoms of a urinary tract infection. These may include:  ¨ Pain when you urinate. ¨ Urinating more often than usual.  ¨ Blood in your urine. · You are dizzy or lightheaded, or you feel like you may faint. Watch closely for changes in your health, and be sure to contact your doctor if:  · You are not getting better after 1 day (24 hours). Where can you learn more? Go to http://lina-zain.info/. Enter T143 in the search box to learn more about \"Abdominal Pain: Care Instructions. \"  Current as of: May 27, 2016  Content Version: 11.2  © 0385-3487 [employer]. Care instructions adapted under license by [employer] (which disclaims liability or warranty for this information). If you have questions about a medical condition or this instruction, always ask your healthcare professional. [place] [address] any warranty or liability for your use of this information. Gastroesophageal Reflux Disease (GERD): Care Instructions  Your Care Instructions    Gastroesophageal reflux disease (GERD) is the backward flow of stomach acid into the esophagus. The esophagus is the tube that leads from your throat to your stomach. A one-way valve prevents the stomach acid from moving up into this tube. When you have GERD, this valve does not close tightly enough. If you have mild GERD symptoms including heartburn, you may be able to control the problem with antacids or over-the-counter medicine. Changing your diet, losing weight, and making other lifestyle changes can also help reduce symptoms. Follow-up care is a key part of your treatment and safety. Be sure to make and go to all appointments, and call your doctor if you are having problems. Its also a good idea to know your test results and keep a list of the medicines you take. How can you care for yourself at home?   · Take your medicines exactly as prescribed. Call your doctor if you think you are having a problem with your medicine. · Your doctor may recommend over-the-counter medicine. For mild or occasional indigestion, antacids, such as Tums, Gaviscon, Mylanta, or Maalox, may help. Your doctor also may recommend over-the-counter acid reducers, such as Pepcid AC, Tagamet HB, Zantac 75, or Prilosec. Read and follow all instructions on the label. If you use these medicines often, talk with your doctor. · Change your eating habits. ¨ Its best to eat several small meals instead of two or three large meals. ¨ After you eat, wait 2 to 3 hours before you lie down. ¨ Chocolate, mint, and alcohol can make GERD worse. ¨ Spicy foods, foods that have a lot of acid (like tomatoes and oranges), and coffee can make GERD symptoms worse in some people. If your symptoms are worse after you eat a certain food, you may want to stop eating that food to see if your symptoms get better. · Do not smoke or chew tobacco. Smoking can make GERD worse. If you need help quitting, talk to your doctor about stop-smoking programs and medicines. These can increase your chances of quitting for good. · If you have GERD symptoms at night, raise the head of your bed 6 to 8 inches by putting the frame on blocks or placing a foam wedge under the head of your mattress. (Adding extra pillows does not work.)  · Do not wear tight clothing around your middle. · Lose weight if you need to. Losing just 5 to 10 pounds can help. When should you call for help? Call your doctor now or seek immediate medical care if:  · You have new or different belly pain. · Your stools are black and tarlike or have streaks of blood. Watch closely for changes in your health, and be sure to contact your doctor if:  · Your symptoms have not improved after 2 days. · Food seems to catch in your throat or chest.  Where can you learn more? Go to http://lina-zain.info/.   Enter H277 in the search box to learn more about \"Gastroesophageal Reflux Disease (GERD): Care Instructions. \"  Current as of: August 9, 2016  Content Version: 11.2  © 9335-2069 [employer], [employer]. Care instructions adapted under license by [employer] (which disclaims liability or warranty for this information). If you have questions about a medical condition or this instruction, always ask your healthcare professional. [place] [address] any warranty or liability for your use of this information.

## 2017-04-10 NOTE — PERIOP NOTES
Cehncho Jeronimo  1950  [id number]          Situation:  Verbal report received from: Myrna Chaney RN  Preoperative diagnosis: Anemia      Background:  Procedure: Procedure(s):  ESOPHAGOGASTRODUODENOSCOPY (EGD)  ESOPHAGOGASTRODUODENAL (EGD) BIOPSY  Physician performing procedure; Dr. Bettylou Cheadle    Patient diabetic no   Patient taking blood thinners no   Patient has a defibrillator: no   Patient needs antibiotics: no     Assessment:  Vital signs stable yes  Alert and oriented x 4  Abdominal assessment: round and soft and tender yes      Recommendation:  Endoscopic Procedure  Family ()  Permission to share finding with Family yes

## 2017-04-10 NOTE — ED NOTES
Patient's O2 sats dropped to 85% on RA following dose of dilaudid. Placed patient on 2L nasal cannula. O2 sats now 96%. MD notified.

## 2017-04-10 NOTE — ED TRIAGE NOTES
Triage Note: \"Severe abdominal pain, we were here last night until 3am with labs and tests, X-ray, CT scan, US negative, blood work negative,\" and diagnosed with gastritis and discharged. Patient was given Rx for Percocet, Zofran, and Prilosec without filling them yet. Patient has been vomiting and suspects that is causing her pain.

## 2017-04-10 NOTE — CONSULTS
[address], [address] [place] [address]               GI Consultation Note    NAME: Frannie Collins   :  1950   MRN:  [id number]     Date/Time:  4/10/2017 12:24 PM  Assessment:   1. Severe Upper Abdominal Pain, nausea and some vomiting  2. Rheumatoid Arthritis  3. Ankylosing Spondylitis      Plan:   1. Repeat lab, flat and upright abdomen  2. IV fluids, pain control  3. PPI's  4. EGD later this afternoon if Xrays are okay          Subjective:     HISTORY OF PRESENT ILLNESS:     Frannie Collins is an 61 y.o.  female who we are asked to see in consultation with severe upper abdominal nausea and some vomiting. She has long standing RA on MTX 15 mg per week and Xeljanz, daily. She also takes 4 to 6 excedrin per day, because of back pain. The pain started after dinner last night and was seen in ER. Lab was okay, CT scan revealed thickened gastric antrum. She was discharged and the pain continued. She is having a lot of pain at present. She has no prior history of GI problems. She had a negative colonoscopy several years ago. Past Medical History:   Diagnosis Date    Autoimmune disease ([address]     rheumatoid arthritis    Ill-defined condition     Spondylosis      Past Surgical History:   Procedure Laterality Date    HX APPENDECTOMY      HX GYN      right ovary and fallopian tube removed    HX ORTHOPAEDIC      back surgery     Social History   Substance Use Topics    Smoking status: Never Smoker    Smokeless tobacco: Not on file    Alcohol use Not on file      History reviewed. No pertinent family history. Allergies   Allergen Reactions    Codeine Unknown (comments)    Penicillin G Unknown (comments)    Sulfa (Sulfonamide Antibiotics) Unknown (comments)    Tetracycline Unknown (comments)      Prior to Admission medications    Medication Sig Start Date End Date Taking?  Authorizing Provider   oxyCODONE-acetaminophen (PERCOCET) 5-325 mg per tablet Take 2 Tabs by mouth every eight (8) hours as needed for Pain. Max Daily Amount: 6 Tabs. 4/10/17   Renee Parson MD   ondansetron (ZOFRAN ODT) 8 mg disintegrating tablet Take 1 Tab by mouth every eight (8) hours as needed for Nausea. 4/10/17   Renee Parson MD   omeprazole (PRILOSEC) 20 mg capsule Take 1 Cap by mouth daily. 4/10/17   Renee Parson MD   methotrexate (RHEUMATREX) 2.5 mg tablet Take 7.5 mg by mouth every Sunday. Pola Corey MD   TOFACITINIB CITRATE (XELJANZ PO) Take  by mouth. Pola Corey MD   HYDROcodone-acetaminophen (NORCO) 5-325 mg per tablet Take 1 Tab by mouth every four (4) hours as needed for Pain. Pola Corey MD   folic acid (FOLVITE) 1 mg tablet Take 1 mg by mouth daily. Pola Corey MD         REVIEW OF SYSTEMS:    Constitutional: negative fever, negative chills, negative weight loss  Eyes:   negative visual changes  ENT:   negative sore throat, tongue or lip swelling  Respiratory:  negative cough, negative dyspnea  Cards:  negative for chest pain, palpitations, lower extremity edema, history of pericarditis  GI:   See HPI  :  negative for frequency, dysuria  Integument:  negative for rash and pruritus  Heme:  negative for easy bruising and gum/nose bleeding  Musculoskel:positve for RA and AS  Neuro: negative for headaches, dizziness, vertigo  Psych:  negative for feelings of anxiety, depression       Objective:   VITALS:    Visit Vitals    /78 (BP 1 Location: Left arm, BP Patient Position: At rest)    Pulse 70    Temp 97.9 °F (36.6 °C)    Resp 18    Ht 5' 4\" (1.626 m)    Wt 52.2 kg (115 lb)    SpO2 100%    BMI 19.74 kg/m2       PHYSICAL EXAM:  General   Uncomfortable WW, appears stated age, icomplaining of severe abdominal pain  EENT  Normocephalic, Atraumatic, PERRLA, EOMI, sclera clear, nares clear, pharynx normal  Neck   Supple without nodes or mass.  No thyromegaly or bruit  Respiratory   Clear To Auscultation bilaterally - no wheezes, rales, rhonchi, or crackles  Cardiology  Regular Rate and Rythmn  - no murmurs, rubs or gallops  Abdominal marked tenderness of epigastrium, non-distended, positive bowel sounds, no hepatosplenomegaly, no palpable mass  Extremities  No clubbing, cyanosis, or edema. Pulses intact. Back  No spinal or muscle pain. No CVAT. Skin  Normal skin turgor. No rashes or skin ulcers noted  Neurological  No focal neurological deficits noted  Psychological  Oriented x 3. Normal affect. LAB DATA REVIEWED:    Recent Results (from the past 24 hour(s))   CBC WITH AUTOMATED DIFF    Collection Time: 04/09/17 10:27 PM   Result Value Ref Range    WBC 5.6 3.6 - 11.0 K/uL    RBC 3.58 (L) 3.80 - 5.20 M/uL    HGB 11.1 (L) 11.5 - 16.0 g/dL    HCT 34.4 (L) 35.0 - 47.0 %    MCV 96.1 80.0 - 99.0 FL    MCH 31.0 26.0 - 34.0 PG    MCHC 32.3 30.0 - 36.5 g/dL    RDW 14.0 11.5 - 14.5 %    PLATELET 538 105 - 620 K/uL    NEUTROPHILS 66 32 - 75 %    LYMPHOCYTES 28 12 - 49 %    MONOCYTES 5 5 - 13 %    EOSINOPHILS 1 0 - 7 %    BASOPHILS 0 0 - 1 %    ABS. NEUTROPHILS 3.6 1.8 - 8.0 K/UL    ABS. LYMPHOCYTES 1.6 0.8 - 3.5 K/UL    ABS. MONOCYTES 0.3 0.0 - 1.0 K/UL    ABS. EOSINOPHILS 0.1 0.0 - 0.4 K/UL    ABS. BASOPHILS 0.0 0.0 - 0.1 K/UL   METABOLIC PANEL, COMPREHENSIVE    Collection Time: 04/09/17 10:27 PM   Result Value Ref Range    Sodium 137 136 - 145 mmol/L    Potassium 4.2 3.5 - 5.1 mmol/L    Chloride 105 97 - 108 mmol/L    CO2 26 21 - 32 mmol/L    Anion gap 6 5 - 15 mmol/L    Glucose 106 (H) 65 - 100 mg/dL    BUN 17 6 - 20 MG/DL    Creatinine 0.73 0.55 - 1.02 MG/DL    BUN/Creatinine ratio 23 (H) 12 - 20      GFR est AA >60 >60 ml/min/1.73m2    GFR est non-AA >60 >60 ml/min/1.73m2    Calcium 9.1 8.5 - 10.1 MG/DL    Bilirubin, total 0.2 0.2 - 1.0 MG/DL    ALT (SGPT) 26 12 - 78 U/L    AST (SGOT) 19 15 - 37 U/L    Alk.  phosphatase 34 (L) 45 - 117 U/L    Protein, total 7.3 6.4 - 8.2 g/dL    Albumin 4.4 3.5 - 5.0 g/dL    Globulin 2.9 2.0 - 4.0 g/dL A-G Ratio 1.5 1.1 - 2.2     LIPASE    Collection Time: 04/09/17 10:27 PM   Result Value Ref Range    Lipase 272 73 - 393 U/L   AMYLASE    Collection Time: 04/09/17 10:27 PM   Result Value Ref Range    Amylase 126 (H) 25 - 115 U/L   CK W/ CKMB & INDEX    Collection Time: 04/09/17 10:27 PM   Result Value Ref Range     26 - 192 U/L    CK - MB 1.2 <3.6 NG/ML    CK-MB Index 0.7 0 - 2.5     D DIMER    Collection Time: 04/09/17 10:27 PM   Result Value Ref Range    D-dimer 0.20 0.00 - 0.65 mg/L FEU   PROTHROMBIN TIME + INR    Collection Time: 04/09/17 10:27 PM   Result Value Ref Range    INR 1.0 0.9 - 1.1      Prothrombin time 10.5 9.0 - 11.1 sec   PTT    Collection Time: 04/09/17 10:27 PM   Result Value Ref Range    aPTT 22.9 22.1 - 32.5 sec    aPTT, therapeutic range     58.0 - 77.0 SECS   TROPONIN I    Collection Time: 04/09/17 10:27 PM   Result Value Ref Range    Troponin-I, Qt. <0.04 <0.05 ng/mL   EKG, 12 LEAD, INITIAL    Collection Time: 04/09/17 11:02 PM   Result Value Ref Range    Ventricular Rate 61 BPM    Atrial Rate 61 BPM    P-R Interval 122 ms    QRS Duration 94 ms    Q-T Interval 466 ms    QTC Calculation (Bezet) 469 ms    Calculated P Axis 66 degrees    Calculated R Axis 48 degrees    Calculated T Axis 82 degrees    Diagnosis       Normal sinus rhythm with sinus arrhythmia  ST & T wave abnormality, consider anterolateral ischemia  No previous ECGs available     URINALYSIS W/ RFLX MICROSCOPIC    Collection Time: 04/10/17  1:22 AM   Result Value Ref Range    Color YELLOW/STRAW      Appearance CLEAR CLEAR      Specific gravity 1.028 1.003 - 1.030      pH (UA) 7.5 5.0 - 8.0      Protein NEGATIVE  NEG mg/dL    Glucose NEGATIVE  NEG mg/dL    Ketone TRACE (A) NEG mg/dL    Bilirubin NEGATIVE  NEG      Blood MODERATE (A) NEG      Urobilinogen 0.2 0.2 - 1.0 EU/dL    Nitrites NEGATIVE  NEG      Leukocyte Esterase NEGATIVE  NEG      WBC 0-4 0 - 4 /hpf    RBC 20-50 0 - 5 /hpf    Epithelial cells FEW FEW /lpf Bacteria NEGATIVE  NEG /hpf    Hyaline cast 0-2 0 - 5 /lpf   EKG, 12 LEAD, INITIAL    Collection Time: 04/10/17  1:45 AM   Result Value Ref Range    Ventricular Rate 68 BPM    Atrial Rate 68 BPM    P-R Interval 140 ms    QRS Duration 90 ms    Q-T Interval 444 ms    QTC Calculation (Bezet) 472 ms    Calculated P Axis 52 degrees    Calculated R Axis 44 degrees    Calculated T Axis 73 degrees    Diagnosis       Normal sinus rhythm  ST & T wave abnormality, consider anterior ischemia  When compared with ECG of 09-APR-2017 23:02,  MANUAL COMPARISON REQUIRED, DATA IS UNCONFIRMED     POC TROPONIN-I    Collection Time: 04/10/17  2:02 AM   Result Value Ref Range    Troponin-I (POC) <0.04 0.00 - 0.08 ng/mL   EKG, 12 LEAD, INITIAL    Collection Time: 04/10/17  2:16 AM   Result Value Ref Range    Ventricular Rate 70 BPM    Atrial Rate 70 BPM    P-R Interval 146 ms    QRS Duration 100 ms    Q-T Interval 428 ms    QTC Calculation (Bezet) 462 ms    Calculated P Axis 45 degrees    Calculated R Axis 30 degrees    Calculated T Axis 94 degrees    Diagnosis       Normal sinus rhythm  T wave abnormality, consider anterior ischemia  When compared with ECG of 10-APR-2017 01:45,  MANUAL COMPARISON REQUIRED, DATA IS UNCONFIRMED         IMAGING RESULTS:  I have personally reviewed the actual imaging studies    CT  4/10/2017:  ________________________________________________________________________    ___________________________________________________  Consulting Physician: Shari Ricketts MD      4/10/2017  12:24 PM

## 2017-04-10 NOTE — PROGRESS NOTES
TRANSFER - IN REPORT:    Verbal report received from Toyin Pringle RN (name) on Calla Linear  being received from ED (unit) for routine progression of care      Report consisted of patients Situation, Background, Assessment and   Recommendations(SBAR). Information from the following report(s) SBAR, Kardex, ED Summary, [facility] and Recent Results was reviewed with the receiving nurse. Opportunity for questions and clarification was provided. Assessment completed upon patients arrival to unit and care assumed.

## 2017-04-10 NOTE — ED NOTES
Pt and family @ bedside updated on care. All verbalized understanding. Pt resting comfortably. V/S stable, no distress noted. Will cont to assess and monitor closely.

## 2017-04-10 NOTE — ED PROVIDER NOTES
HPI Comments: 61 y.o. female with past medical history significant for Rheumatoid arthritis, Appendectomy, Pericarditis who presents from home accompanied by spouse with chief complaint of abdominal pain. Pt reports an acute onset of moderate diffuse abdominal pain x 1900 accompanied by nausea and one episode of vomiting. Pt denies hx of similar symptoms. Pt also c/o chest tightness throughout today. Pt denies diarrhea, constipation, headache, neck pain, back pain, fever, cough, congestion, chills or any other acute medical conerns at this time. PCP: Chris Valdez MD    Note written by Nidia Herrera, as dictated by Richa De Leon MD 11:05 PM    The history is provided by the patient. No  was used. Past Medical History:   Diagnosis Date    Autoimmune disease ([facility] [address]     rheumatoid arthritis       History reviewed. No pertinent surgical history. History reviewed. No pertinent family history. Social History     Social History    Marital status:      Spouse name: N/A    Number of children: N/A    Years of education: N/A     Occupational History    Not on file. Social History Main Topics    Smoking status: Not on file    Smokeless tobacco: Not on file    Alcohol use Not on file    Drug use: Not on file    Sexual activity: Not on file     Other Topics Concern    Not on file     Social History Narrative    No narrative on file         ALLERGIES: Codeine; Penicillin g; Sulfa (sulfonamide antibiotics); and Tetracycline    Review of Systems   Constitutional: Negative for fever. HENT: Negative for congestion and sore throat. Eyes: Negative for photophobia. Respiratory: Positive for chest tightness. Negative for cough and shortness of breath. Cardiovascular: Negative for chest pain and leg swelling. Gastrointestinal: Positive for abdominal pain, nausea and vomiting. Negative for constipation and diarrhea.    Genitourinary: Negative for difficulty urinating, dysuria and hematuria. Musculoskeletal: Negative for back pain and neck pain. Skin: Negative for rash. Neurological: Negative for dizziness, weakness, numbness and headaches. All other systems reviewed and are negative. Vitals:    04/09/17 2205   BP: (!) 159/94   Pulse: 76   Resp: 18   Temp: 97.7 °F (36.5 °C)   SpO2: 99%   Weight: 54.4 kg (120 lb)   Height: 5' 4\" (1.626 m)            Physical Exam   Nursing note and vitals reviewed. CONSTITUTIONAL: Well-appearing; well-nourished; in mild distress  HEAD: Normocephalic; atraumatic  EYES: PERRL; EOM intact; conjunctiva and sclera are clear bilaterally. ENT: No rhinorrhea; normal pharynx with no tonsillar hypertrophy; mucous membranes pink/moist, no erythema, no exudate. NECK: Supple; non-tender; no cervical lymphadenopathy  CARD: Normal S1, S2; no murmurs, rubs, or gallops. Regular rate and rhythm. RESP: Normal respiratory effort; breath sounds clear and equal bilaterally; no wheezes, rhonchi, or rales. ABD: Normal bowel sounds; non-distended; Diffuse epigastric tenderness; no rebound or guarding; no palpable organomegaly, no masses, no bruits. Back Exam: Normal inspection; no vertebral point tenderness, no CVA tenderness. Normal range of motion. EXT: Normal ROM in all four extremities; non-tender to palpation; no swelling or deformity; distal pulses are normal, no edema. SKIN: Warm; dry; no rash. NEURO:Alert and oriented x 3, coherent, KEYLA-XII grossly intact, sensory and motor are non-focal.        MDM  Number of Diagnoses or Management Options  Abdominal pain, epigastric:   Gastritis without bleeding, unspecified chronicity, unspecified gastritis type:   Diagnosis management comments: Assessment: sudden onset of epigastric abdominal discomfort in a patient with history of chronic pain and status post appendectomy.  Differential diagnosis includes GERD, gastritis, ACS, pancreatitis, biliary colic, obstipation,bowel obstruction, VTE      Plan: EKG/chest x-ray/lab/IV fluid/antiemetic with analgesia/ CT scan of the abdomen and pelvis/ GI cocktail/ Serial exam/ Monitor and Reevaluate. Amount and/or Complexity of Data Reviewed  Clinical lab tests: ordered and reviewed  Tests in the radiology section of CPT®: ordered and reviewed  Tests in the medicine section of CPT®: reviewed and ordered  Discussion of test results with the performing providers: yes  Decide to obtain previous medical records or to obtain history from someone other than the patient: yes  Obtain history from someone other than the patient: yes  Review and summarize past medical records: yes  Discuss the patient with other providers: yes  Independent visualization of images, tracings, or specimens: yes    Risk of Complications, Morbidity, and/or Mortality  Presenting problems: moderate  Diagnostic procedures: moderate  Management options: moderate      ED Course       Procedures     ED EKG interpretation:  Rhythm: normal sinus rhythm; and regular . Rate (approx.): 61; Axis: normal; P wave: normal; QRS interval: normal ; ST/T wave: non-specific changes; in  Lead: Diffusely: sinus arrhythmia; Other findings: abnormal ekg. This EKG was interpreted by Mark Al MD,ED Provider. XRAY INTERPRETATION (ED MD)  Chest Xray  No acute process seen. Normal heart size. No bony abnormalities. No infiltrate. Mark Al MD 11:01 PM    .    Progress Note:   Pt has been reexamined by Mark Al MD. Pt is feeling much better. Symptoms have improved. All available results have been reviewed with pt and any available family. The patient has a high tolerance and threshold for pain because of chronic narcotic dependency. She had an extensive workup that didn't reveal any significant abnormality. Suspect acute gastritis. The patient had 2 sets of negative cardiac enzymes approximately 2-3 hours apart. Pt understands sx, dx, and tx in ED.  Care plan has been outlined and questions have been answered. Pt is ready to go home. Will send home on Epigastric abdominal pain and gastritis instructions. Prescription of Prilosec, Percocet and Zofran. Outpatient referral with PCP/GI as needed. Written by Tania Schirmer, MD,8:06 AM    .   .

## 2017-04-10 NOTE — ED PROVIDER NOTES
HPI Comments: 61 y.o. female with past medical history significant for rheumatoid arthritis and spondylosis who presents with chief complaint of abdominal pain. Yesterday evening (19:00), patient developed abrupt onset of diffuse abdominal pain, accompanied by nausea and 1 episode of vomiting. She was seen here in the ED later that evening. CXR showed no acute findings. CT suggestive of gastroenteritis likely attributable to nonspecific infection or inflammation; no bowel obstruction. She was discharged home on Prilosec, Zofran, and Percocet, along with PCP and GI f/u as needed. Since then, she has continued to experience persistent worsening of symptoms, which prompted today's ED return visit. Patient denies any back pain or flank pain. There are no other acute medical concerns at this time. Social hx: +; denies tobacco smoking  PCP: Jaxon Baumann MD    Note written by Nidia Fisher, as dictated by Vane Le MD 12:23 PM    The history is provided by the patient. Past Medical History:   Diagnosis Date    Autoimmune disease ([facility] [address]     rheumatoid arthritis    Ill-defined condition     Spondylosis       Past Surgical History:   Procedure Laterality Date    HX APPENDECTOMY      HX GYN      right ovary and fallopian tube removed    HX ORTHOPAEDIC      back surgery         History reviewed. No pertinent family history. Social History     Social History    Marital status:      Spouse name: N/A    Number of children: N/A    Years of education: N/A     Occupational History    Not on file.      Social History Main Topics    Smoking status: Never Smoker    Smokeless tobacco: Not on file    Alcohol use Not on file    Drug use: Not on file    Sexual activity: Not on file     Other Topics Concern    Not on file     Social History Narrative         ALLERGIES: Codeine; Penicillin g; Sulfa (sulfonamide antibiotics); and Tetracycline    Review of Systems   Constitutional: Negative for chills, diaphoresis and fever. HENT: Negative for congestion, postnasal drip, rhinorrhea and sore throat. Eyes: Negative for photophobia, discharge, redness and visual disturbance. Respiratory: Negative for cough, chest tightness, shortness of breath and wheezing. Cardiovascular: Negative for chest pain, palpitations and leg swelling. Gastrointestinal: Positive for abdominal pain, nausea and vomiting. Negative for abdominal distention, blood in stool, constipation and diarrhea. Genitourinary: Negative for difficulty urinating, dysuria, frequency, hematuria and urgency. Musculoskeletal: Negative for arthralgias, back pain, joint swelling and myalgias. Skin: Negative for color change and rash. Neurological: Negative for dizziness, speech difficulty, weakness, light-headedness, numbness and headaches. Psychiatric/Behavioral: Negative for confusion. The patient is not nervous/anxious. All other systems reviewed and are negative. Vitals:    04/10/17 1045   BP: 158/78   Pulse: 70   Resp: 18   Temp: 97.9 °F (36.6 °C)   SpO2: 100%   Weight: 52.2 kg (115 lb)   Height: 5' 4\" (1.626 m)            Physical Exam   Constitutional: She is oriented to person, place, and time. She appears well-developed and well-nourished. No distress. HENT:   Head: Normocephalic and atraumatic. Right Ear: External ear normal.   Left Ear: External ear normal.   Nose: Nose normal.   Mouth/Throat: Oropharynx is clear and moist.   Eyes: Conjunctivae and EOM are normal. Pupils are equal, round, and reactive to light. No scleral icterus. Neck: Normal range of motion. Neck supple. No JVD present. No tracheal deviation present. No thyromegaly present. Cardiovascular: Normal rate, regular rhythm and normal heart sounds. Exam reveals no gallop and no friction rub. No murmur heard. Pulmonary/Chest: Effort normal and breath sounds normal. No respiratory distress. She has no wheezes. She has no rales.  She exhibits no tenderness. Abdominal: Soft. She exhibits no distension and no mass. Bowel sounds are increased. There is tenderness. There is guarding. There is no rebound. Diffuse tenderness, greatest in the epigastrium. Musculoskeletal: Normal range of motion. She exhibits no edema or tenderness. Lymphadenopathy:     She has no cervical adenopathy. Neurological: She is alert and oriented to person, place, and time. She has normal strength. She displays no atrophy and no tremor. No cranial nerve deficit. She exhibits normal muscle tone. Coordination and gait normal.   Skin: Skin is warm and dry. No rash noted. She is not diaphoretic. No erythema. Psychiatric: She has a normal mood and affect. Her behavior is normal. Judgment and thought content normal.   Nursing note and vitals reviewed. Note written by Nidia Cote, as dictated by Gold Somers MD 12:23 PM    MDM  Number of Diagnoses or Management Options  Diagnosis management comments: GONCALVES  Impression: 68-year-old female presenting to the emergency department with worsening epigastric abdominal pain which started yesterday evening at around 1900 hrs. She was seen here last night workup revealed probable gastroenteritis either infectious or inflammatory on CT scan. I reviewed yesterday's workup, she now presents with worsening pain. In the interim I spoke with Dr. Luis Ma who will be performing an endoscopy on the patient later this afternoon. Differential includes gastric ulcer, duodenal ulcer, consider perforation, consider other infectious or inflammatory process involving areas of noted on CT scan. Consider pancreatitis. Consider gallbladder disease. Plan of care will be repeat baseline lab, IV fluids, analgesics and antiemetic medication, and we'll perform a plain x-ray upright and flat of the abdomen to rule out free air. ED Course       Procedures      12:15 PM  Dr. Baldev Truong has seen the patient.   He recommends admission to Hospitalist service.     1:27 PM  Pt was taken to endoscopy suite without me clearing her labs, unknown status of her abd xray to r/o free air as pt was moved to endoscopy, Dr Rajendra Mcgovern aware as we spoke on the phone

## 2017-04-10 NOTE — ROUTINE PROCESS
Triny Zavala  1950  [id number]    Situation:  Verbal report received from: Ruthy  Procedure: Procedure(s):  ESOPHAGOGASTRODUODENOSCOPY (EGD)  ESOPHAGOGASTRODUODENAL (EGD) BIOPSY    Background:    Preoperative diagnosis: Anemia  Postoperative diagnosis: 1-Multiple gastric ulcers  2-Gastritis    :  Dr. Cisco Dias  Assistant(s): Endoscopy Technician-1: Lavon Schilder  Endoscopy RN-1: Haylie Atkins RN    Specimens:   ID Type Source Tests Collected by Time Destination   1 : Biopsy - antral ulcers Preservative   Jaleesa Bowers MD 4/10/2017 1345 Pathology     H. Pylori  no    Assessment:  Intra-procedure medications     Anesthesia gave intra-procedure sedation and medications, see anesthesia flow sheet yes    Intravenous fluids: NS@ KVO     Vital signs stable     Abdominal assessment: round and soft     Recommendation:  Discharge patient per MD order.     Family or Friend   Permission to share finding with family or friend yes

## 2017-04-10 NOTE — PROGRESS NOTES
TRANSFER - OUT REPORT:    Verbal report given to Pilar(arash) on Sedonia Spinner  being transferred to ER (unit) for routine post - op       Report consisted of patients Situation, Background, Assessment and   Recommendations(SBAR). Information from the following report(s) Procedure Summary was reviewed with the receiving nurse. Opportunity for questions and clarification was provided.       Patient transported with:

## 2017-04-10 NOTE — PROGRESS NOTES
Admission Medication Reconciliation:    Information obtained from: Patient, family member, and Rx Query. Significant PMH/Disease States:   Past Medical History:   Diagnosis Date    Autoimmune disease ([address]     rheumatoid arthritis    Ill-defined condition     Spondylosis       Chief Complaint for this Admission:    Chief Complaint   Patient presents with    Abdominal Pain    Vomiting         Allergies:  Codeine; Penicillin g; Sulfa (sulfonamide antibiotics); and Tetracycline    Prior to Admission Medications:   Prior to Admission Medications   Prescriptions Last Dose Informant Patient Reported? Taking? LORazepam (ATIVAN) 1 mg tablet 4/1/2017 at pm  Yes Yes   Sig: Take 1-2 mg by mouth nightly. acetaminophen (TYLENOL EXTRA STRENGTH) 500 mg tablet 4/3/2017 at Unknown time  Yes Yes   Sig: Take 500 mg by mouth every six (6) hours as needed for Pain. aspirin-acetaminophen-caffeine (EXCEDRIN ES) 250-250-65 mg per tablet 4/3/2017 at Unknown time  Yes Yes   Sig: Take 1 Tab by mouth as needed for Headache.   carboxymethylcellulose sodium (RETAINE CMC) 0.5 % dpet 4/9/2017 at Unknown time  Yes Yes   Sig: Administer 1 Drop to both eyes as needed. Uses drops 6-7 times a day. cetirizine (ZYRTEC) 10 mg tablet 4/3/2017 at Unknown time  Yes Yes   Sig: Take 10 mg by mouth daily as needed for Allergies. codeine-butalbital-acetaminophen-caffeine (FIORICET WITH CODEINE) -38-30 mg per capsule 4/8/2017 at AM  Yes Yes   Sig: Take 1-2 Caps by mouth every four (4) hours as needed for Headache. cyclobenzaprine (FLEXERIL) 10 mg tablet   Yes No   Sig: Take 10 mg by mouth two (2) times a day. etodolac (LODINE) 500 mg tablet   Yes No   Sig: Take 500 mg by mouth two (2) times a day. fish oil-omega-3 fatty acids 340-1,000 mg capsule 4/9/2017 at am  Yes Yes   Sig: Take 1 Cap by mouth three (3) times daily.    fluorouracil (EFUDEX) 5 % chemo cream 3/10/2017 at Unknown time  Yes Yes   Sig: Apply  to affected area two (2) times a day. forehead   folic acid (FOLVITE) 1 mg tablet 4/9/2017 at AM  Yes Yes   Sig: Take 4 mg by mouth daily. lifitegrast (XIIDRA) 5 % dpet 4/9/2017 at Unknown time  Yes Yes   Sig: Apply 1 Drop to eye two (2) times a day. methotrexate (RHEUMATREX) 2.5 mg tablet 4/7/2017 at PM  Yes Yes   Sig: Take 7.5 mg by mouth BID every Friday. Daily total = 15 mg every Friday. oxyCODONE-acetaminophen (PERCOCET) 7.5-325 mg per tablet 4/9/2017 at Unknown time  Yes Yes   Sig: Take 1 Tab by mouth every eight (8) hours as needed for Pain. tofacitinib (XELJANZ) 5 mg tab 4/9/2017 at AM  Yes Yes   Sig: Take 5 mg by mouth two (2) times a day. Facility-Administered Medications: None         Comments/Recommendations: Updated PTA meds and added reactions to listed allergies. 1) Updated dose/directions of folic acid, methotrexate, xeljanz. 2) Deleted Norco, omeprazole, zofran,  3) Added lorazepam, fluorouracil chemo cream, fish oil, zyrtec, Excedrin, lifitegrast eye drops, and retain eye drops. 4) Concerned about total acetaminophen daily use because patient takes Tylenol, Fioricet, Excedrin, and Percocet. 5) Pt takes methotrexate every Friday. Does 7.5 mg in the AM and 7.5 mg in the PM for total dose of 15 mg every Friday. 6) Added etodolac and cyclobenzaprine per Rx Query. Went back in room to confirm but patient was not there.

## 2017-04-10 NOTE — ED NOTES
CONSULT NOTE:  2:50 PM Simin Rosales MD spoke with Dr. Elan Patten, Consult for Hospitalist.  Discussed available diagnostic tests and clinical findings. Dr. Elan Patten will admit the patient.

## 2017-04-10 NOTE — H&P
History & Physical  Clinical Observation Unit    Date of admission: 4/10/2017    Patient name: Gerry Field  MRN: [id number]  YOB: 1950  Age: 61 y.o. Primary care provider:  Marolyn Bosworth, MD     Source of Information: patient, medical records and attending physician                                 Chief complain: sever abdominal pain   History of present illness  Gerry Field is a 61 y.o. female with past medical history as documented below  who presents with acute onset abdominal pain. This patient reports that she was in her usual state of chronic health until two days PTA when she began having acute diffuse abdominal pain associated with chills, vomiting, anorexia, and generalize malaise. She presented to the ER and was evaluated via  Abdominal CT scan( 4/10/17) that was suggestive of gastroenteritis likely attributable to nonspecific infection or inflammation. There is no bowel obstruction. She was discharged from ER but her pain returned described as unbearable with continued associated symptoms as well as an exacerbation of her chronic headaches. She returned to the ER and underwent a EGD with biposies by Dr. Maxi Morin that identified multiple gastric ulcers. Her further laboratory work up Hgb 11.1, WBC 6.8 , plt 310 , , K 4.1. She was given IVF and IV analgesic she is now admitted to the observation for continue monitoring post procedure and pain control. Past Medical History:   Diagnosis Date    Autoimmune disease ([address]     rheumatoid arthritis    Ill-defined condition     Spondylosis      Past Surgical History:   Procedure Laterality Date    HX APPENDECTOMY      HX GYN      right ovary and fallopian tube removed    HX ORTHOPAEDIC      back surgery     Prior to Admission medications    Medication Sig Start Date End Date Taking?  Authorizing Provider   fluorouracil (EFUDEX) 5 % chemo cream Apply to affected area two (2) times a day. forehead   Yes Historical Provider   lifitegrast Margaret Bakes) 5 % dpet Apply 1 Drop to eye two (2) times a day. Yes Historical Provider   LORazepam (ATIVAN) 1 mg tablet Take 1-2 mg by mouth nightly. Yes Historical Provider   codeine-butalbital-acetaminophen-caffeine (FIORICET WITH CODEINE) -60-30 mg per capsule Take 1-2 Caps by mouth every four (4) hours as needed for Headache. Yes Historical Provider   aspirin-acetaminophen-caffeine (EXCEDRIN ES) 250-250-65 mg per tablet Take 1 Tab by mouth as needed for Headache. Yes Historical Provider   acetaminophen (TYLENOL EXTRA STRENGTH) 500 mg tablet Take 500 mg by mouth every six (6) hours as needed for Pain. Yes Historical Provider   oxyCODONE-acetaminophen (PERCOCET) 7.5-325 mg per tablet Take 1 Tab by mouth every eight (8) hours as needed for Pain. Yes Historical Provider   cetirizine (ZYRTEC) 10 mg tablet Take 10 mg by mouth daily as needed for Allergies. Yes Historical Provider   fish oil-omega-3 fatty acids 340-1,000 mg capsule Take 1 Cap by mouth three (3) times daily. Yes Historical Provider   carboxymethylcellulose sodium (RETAINE CMC) 0.5 % dpet Administer 1 Drop to both eyes as needed. Uses drops 6-7 times a day. Yes Historical Provider   tofacitinib (XELJANZ) 5 mg tab Take 5 mg by mouth two (2) times a day. Yes Historical Provider   methotrexate (RHEUMATREX) 2.5 mg tablet Take 7.5 mg by mouth BID every Friday. Daily total = 15 mg every Friday. Yes Pola Corey MD   folic acid (FOLVITE) 1 mg tablet Take 4 mg by mouth daily. Yes Pola Corey MD   etodolac (LODINE) 500 mg tablet Take 500 mg by mouth two (2) times a day. Historical Provider   cyclobenzaprine (FLEXERIL) 10 mg tablet Take 10 mg by mouth two (2) times a day.     Historical Provider     Allergies   Allergen Reactions    Codeine Unknown (comments)     Stomach upset    Penicillin G Unknown (comments)     Reaction as a child (doesn't remember)    Sulfa (Sulfonamide Antibiotics) Unknown (comments)     Blisters/welts around lips/mouth    Tetracycline Unknown (comments)     Raised welts around body and mainly back      History reviewed. No pertinent family history. Family history reviewed and non-contributory. Social history  Patient resides  x  Independently      With family care      Assisted living      SNF    Ambulates  x  Independently      With cane       Assisted walker         Alcohol history     None   x  Social     Chronic   Smoking history    None   x  Former smoker     Current smoker       Code status  x  Full code     DNR/DNI        Code status discussed with the patient/caregivers. Full Code    Review of systems  The patient denies any fever, chills, chest pain, cough, congestion, recent illness, palpitations, or dysuria. Constitutional: positive for chills, malaise and anorexia  Eyes: positive for contacts/glasses  Ears, Nose, Mouth, Throat, and Face: negative  Respiratory: negative  Cardiovascular: negative  Gastrointestinal: positive for dysphagia, reflux symptoms, vomiting and abdominal pain  Genitourinary:negative  Integument/Breast: negative  Hematologic/Lymphatic: negative  Musculoskeletal:positive for myalgias, arthralgias, stiff joints, neck pain and back pain  Neurological: positive for headaches   The remainder of the review of systems was reviewed and is noncontributory. Physical Examination   Visit Vitals    /77 (BP 1 Location: Left arm, BP Patient Position: At rest)    Pulse (!) 53    Temp 98.5 °F (36.9 °C)    Resp 18    Ht 5' 4\" (1.626 m)    Wt 52.2 kg (115 lb)    SpO2 97%    BMI 19.74 kg/m2      O2 Flow Rate (L/min): 2 l/min   O2 Device: Room air    General:  Alert, cooperative, no distress   Head:  Normocephalic, without obvious abnormality, atraumatic   Eyes:  Conjunctivae/corneas clear. PERRL, EOMs intact   E/N/M/T: Nares normal. Septum midline.  No nasal drainage or sinus tenderness  Lips, mucosa, and tongue normal   Teeth and gums normal  Clear oropharynx   Neck: Normal appearance and movements, symmetrical, trachea midline  No palpable adenopathy  No thyroid enlargement, tenderness or nodules  No carotid bruit   Normal JVP   Lungs:   Symmetrical chest expansion and respiratory effort  Clear to auscultation bilaterally   Chest wall:  No tenderness or deformity   Heart:  Regular rhythm   Sounds normal; no murmur, click, rub or gallop   Abdomen:   Soft, TTP   Bowel sounds normal  No masses or hepatosplenomegaly  No hernias present   Back: No CVA tenderness   Extremities: Extremities normal, atraumatic  No cyanosis or edema  No DVT signs   Pulses 2+ and symmetric all extremities   Skin: No rashes or ulcers   Musculo-      skeletal: Gait not tested  Normal symmetry, ROM, strength and tone   Neuro: Normal cranial nerves  Normal reflexes and sensation   Psych: Alert, oriented x3  Normal affect, judgement and insight   Geniturinary: deferred     Data Review      24 Hour Results:  Recent Results (from the past 24 hour(s))   CBC WITH AUTOMATED DIFF    Collection Time: 04/09/17 10:27 PM   Result Value Ref Range    WBC 5.6 3.6 - 11.0 K/uL    RBC 3.58 (L) 3.80 - 5.20 M/uL    HGB 11.1 (L) 11.5 - 16.0 g/dL    HCT 34.4 (L) 35.0 - 47.0 %    MCV 96.1 80.0 - 99.0 FL    MCH 31.0 26.0 - 34.0 PG    MCHC 32.3 30.0 - 36.5 g/dL    RDW 14.0 11.5 - 14.5 %    PLATELET 370 034 - 860 K/uL    NEUTROPHILS 66 32 - 75 %    LYMPHOCYTES 28 12 - 49 %    MONOCYTES 5 5 - 13 %    EOSINOPHILS 1 0 - 7 %    BASOPHILS 0 0 - 1 %    ABS. NEUTROPHILS 3.6 1.8 - 8.0 K/UL    ABS. LYMPHOCYTES 1.6 0.8 - 3.5 K/UL    ABS. MONOCYTES 0.3 0.0 - 1.0 K/UL    ABS. EOSINOPHILS 0.1 0.0 - 0.4 K/UL    ABS.  BASOPHILS 0.0 0.0 - 0.1 K/UL   METABOLIC PANEL, COMPREHENSIVE    Collection Time: 04/09/17 10:27 PM   Result Value Ref Range    Sodium 137 136 - 145 mmol/L    Potassium 4.2 3.5 - 5.1 mmol/L    Chloride 105 97 - 108 mmol/L    CO2 26 21 - 32 mmol/L    Anion gap 6 5 - 15 mmol/L    Glucose 106 (H) 65 - 100 mg/dL    BUN 17 6 - 20 MG/DL    Creatinine 0.73 0.55 - 1.02 MG/DL    BUN/Creatinine ratio 23 (H) 12 - 20      GFR est AA >60 >60 ml/min/1.73m2    GFR est non-AA >60 >60 ml/min/1.73m2    Calcium 9.1 8.5 - 10.1 MG/DL    Bilirubin, total 0.2 0.2 - 1.0 MG/DL    ALT (SGPT) 26 12 - 78 U/L    AST (SGOT) 19 15 - 37 U/L    Alk.  phosphatase 34 (L) 45 - 117 U/L    Protein, total 7.3 6.4 - 8.2 g/dL    Albumin 4.4 3.5 - 5.0 g/dL    Globulin 2.9 2.0 - 4.0 g/dL    A-G Ratio 1.5 1.1 - 2.2     LIPASE    Collection Time: 04/09/17 10:27 PM   Result Value Ref Range    Lipase 272 73 - 393 U/L   AMYLASE    Collection Time: 04/09/17 10:27 PM   Result Value Ref Range    Amylase 126 (H) 25 - 115 U/L   CK W/ CKMB & INDEX    Collection Time: 04/09/17 10:27 PM   Result Value Ref Range     26 - 192 U/L    CK - MB 1.2 <3.6 NG/ML    CK-MB Index 0.7 0 - 2.5     D DIMER    Collection Time: 04/09/17 10:27 PM   Result Value Ref Range    D-dimer 0.20 0.00 - 0.65 mg/L FEU   PROTHROMBIN TIME + INR    Collection Time: 04/09/17 10:27 PM   Result Value Ref Range    INR 1.0 0.9 - 1.1      Prothrombin time 10.5 9.0 - 11.1 sec   PTT    Collection Time: 04/09/17 10:27 PM   Result Value Ref Range    aPTT 22.9 22.1 - 32.5 sec    aPTT, therapeutic range     58.0 - 77.0 SECS   TROPONIN I    Collection Time: 04/09/17 10:27 PM   Result Value Ref Range    Troponin-I, Qt. <0.04 <0.05 ng/mL   EKG, 12 LEAD, INITIAL    Collection Time: 04/09/17 11:02 PM   Result Value Ref Range    Ventricular Rate 61 BPM    Atrial Rate 61 BPM    P-R Interval 122 ms    QRS Duration 94 ms    Q-T Interval 466 ms    QTC Calculation (Bezet) 469 ms    Calculated P Axis 66 degrees    Calculated R Axis 48 degrees    Calculated T Axis 82 degrees    Diagnosis       Normal sinus rhythm with sinus arrhythmia  ST & T wave abnormality, consider anterolateral ischemia  No previous ECGs available  Confirmed by Jakob Godwin MD ([id number]) on 4/10/2017 2:00:37 PM     URINALYSIS W/ RFLX MICROSCOPIC    Collection Time: 04/10/17  1:22 AM   Result Value Ref Range    Color YELLOW/STRAW      Appearance CLEAR CLEAR      Specific gravity 1.028 1.003 - 1.030      pH (UA) 7.5 5.0 - 8.0      Protein NEGATIVE  NEG mg/dL    Glucose NEGATIVE  NEG mg/dL    Ketone TRACE (A) NEG mg/dL    Bilirubin NEGATIVE  NEG      Blood MODERATE (A) NEG      Urobilinogen 0.2 0.2 - 1.0 EU/dL    Nitrites NEGATIVE  NEG      Leukocyte Esterase NEGATIVE  NEG      WBC 0-4 0 - 4 /hpf    RBC 20-50 0 - 5 /hpf    Epithelial cells FEW FEW /lpf    Bacteria NEGATIVE  NEG /hpf    Hyaline cast 0-2 0 - 5 /lpf   EKG, 12 LEAD, INITIAL    Collection Time: 04/10/17  1:45 AM   Result Value Ref Range    Ventricular Rate 68 BPM    Atrial Rate 68 BPM    P-R Interval 140 ms    QRS Duration 90 ms    Q-T Interval 444 ms    QTC Calculation (Bezet) 472 ms    Calculated P Axis 52 degrees    Calculated R Axis 44 degrees    Calculated T Axis 73 degrees    Diagnosis       Normal sinus rhythm  ST & T wave abnormality, consider anterior ischemia  When compared with ECG of 09-APR-2017 23:02,  MANUAL COMPARISON REQUIRED, DATA IS UNCONFIRMED  Confirmed by Juan Feng MD ([id number]) on 4/10/2017 2:04:41 PM     POC TROPONIN-I    Collection Time: 04/10/17  2:02 AM   Result Value Ref Range    Troponin-I (POC) <0.04 0.00 - 0.08 ng/mL   EKG, 12 LEAD, INITIAL    Collection Time: 04/10/17  2:16 AM   Result Value Ref Range    Ventricular Rate 70 BPM    Atrial Rate 70 BPM    P-R Interval 146 ms    QRS Duration 100 ms    Q-T Interval 428 ms    QTC Calculation (Bezet) 462 ms    Calculated P Axis 45 degrees    Calculated R Axis 30 degrees    Calculated T Axis 94 degrees    Diagnosis       Normal sinus rhythm  T wave abnormality, consider anterior ischemia  When compared with ECG of 10-APR-2017 01:45,  MANUAL COMPARISON REQUIRED, DATA IS UNCONFIRMED  Confirmed by Juan Feng MD ([id number]) on 4/10/2017 2:04:34 PM     CBC WITH AUTOMATED DIFF    Collection Time: 04/10/17 12:05 PM   Result Value Ref Range    WBC 6.8 3.6 - 11.0 K/uL    RBC 3.60 (L) 3.80 - 5.20 M/uL    HGB 11.1 (L) 11.5 - 16.0 g/dL    HCT 34.1 (L) 35.0 - 47.0 %    MCV 94.7 80.0 - 99.0 FL    MCH 30.8 26.0 - 34.0 PG    MCHC 32.6 30.0 - 36.5 g/dL    RDW 14.0 11.5 - 14.5 %    PLATELET 811 363 - 026 K/uL    NEUTROPHILS 84 (H) 32 - 75 %    LYMPHOCYTES 9 (L) 12 - 49 %    MONOCYTES 7 5 - 13 %    EOSINOPHILS 0 0 - 7 %    BASOPHILS 0 0 - 1 %    ABS. NEUTROPHILS 5.7 1.8 - 8.0 K/UL    ABS. LYMPHOCYTES 0.6 (L) 0.8 - 3.5 K/UL    ABS. MONOCYTES 0.5 0.0 - 1.0 K/UL    ABS. EOSINOPHILS 0.0 0.0 - 0.4 K/UL    ABS. BASOPHILS 0.0 0.0 - 0.1 K/UL    DF SMEAR SCANNED      RBC COMMENTS ANISOCYTOSIS  1+        RBC COMMENTS SCHISTOCYTES  FEW       METABOLIC PANEL, COMPREHENSIVE    Collection Time: 04/10/17 12:05 PM   Result Value Ref Range    Sodium 136 136 - 145 mmol/L    Potassium 4.1 3.5 - 5.1 mmol/L    Chloride 104 97 - 108 mmol/L    CO2 25 21 - 32 mmol/L    Anion gap 7 5 - 15 mmol/L    Glucose 112 (H) 65 - 100 mg/dL    BUN 13 6 - 20 MG/DL    Creatinine 0.67 0.55 - 1.02 MG/DL    BUN/Creatinine ratio 19 12 - 20      GFR est AA >60 >60 ml/min/1.73m2    GFR est non-AA >60 >60 ml/min/1.73m2    Calcium 9.3 8.5 - 10.1 MG/DL    Bilirubin, total 0.4 0.2 - 1.0 MG/DL    ALT (SGPT) 24 12 - 78 U/L    AST (SGOT) 17 15 - 37 U/L    Alk.  phosphatase 32 (L) 45 - 117 U/L    Protein, total 7.6 6.4 - 8.2 g/dL    Albumin 4.6 3.5 - 5.0 g/dL    Globulin 3.0 2.0 - 4.0 g/dL    A-G Ratio 1.5 1.1 - 2.2     LIPASE    Collection Time: 04/10/17 12:05 PM   Result Value Ref Range    Lipase 116 73 - 393 U/L     Recent Labs      04/10/17   1205  04/09/17   2227   WBC  6.8  5.6   HGB  11.1*  11.1*   HCT  34.1*  34.4*   PLT  310  314     Recent Labs      04/10/17   1205  04/09/17   2227   NA  136  137   K  4.1  4.2   CL  104  105   CO2  25  26   GLU  112*  106*   BUN  13  17   CREA  0.67  0.73   CA  9.3  9.1   ALB  4.6  4.4   TBILI 0.4  0.2   SGOT  17  19   ALT  24  26   INR   --   1.0       Imaging  FINDINGS: ABD/Pelvis CT 4/10/17  The visualized lung bases demonstrate no mass or consolidation. There is a  stable benign 3 mm nodule in the left lower lobe. The heart size is normal.  There is no pericardial or pleural effusion.     The liver, spleen, pancreas, and adrenal glands are normal. The kidneys are  symmetric without hydronephrosis. The gall bladder is present without intra- or  extra-hepatic biliary dilatation.      There is distal gastric wall thickening with mild adjacent fluid and fat  stranding. There are multiple fluid-filled but nondilated small bowel loops. There are no dilated bowel loops. The appendix is not well seen. There are no  enlarged lymph nodes. There is no free air. The aorta is tortuous but tapers  without aneurysm.     The urinary bladder is normal. There is no pelvic mass.      The bony structures are age-appropriate.     IMPRESSION:   Findings suggestive of gastroenteritis likely attributable to nonspecific  infection or inflammation. There is no bowel obstruction. Assessment and Plan   1. Abdominal pain s/p EGD with gastric ulcers  - Admit to observation  - Avoid NSAID  - PPI/ carafate  - IVF  - pain control  - Clear liquids - advance to GI lyte  - GI consult  2.  Rheumatologic disorders/ Autoimmune disease   - F/u with outpatient provider  - may need alternative medication other than Etodlac      Diet: Clear  Activity: as tolerated   Consultations: GI   Anticipated disposition: 1-2 Appropriate for COU observation       Signed by: Clarissa Dixon NP    April 10, 2017 at 4:45 PM

## 2017-04-10 NOTE — PROGRESS NOTES
Pt up from ED in stretcher. Pt up to BR with minimal assist.  Pt rates pain 4/10 but was recently medicated. Pt asked if there was anything ordered for pain for later. No orders in at this time. Paged and spoke with Dr. Cesario Tucekr, he will come up to see pt and place orders. This nurse did not realize there were not admitting orders in for pt, MD made aware.

## 2017-04-10 NOTE — ED TRIAGE NOTES
Pt presents with complaints of abdominal pain that began approximately 2 hours ago, pt reports pain in upper abdomen, pt reports nausea and dry heaving, no diarrhea.

## 2017-04-10 NOTE — PROCEDURES
[address]  [facility], [address] [facility]               : Jovani Moody MD    Referring Provider: Tabatha Brunner MD    Sedation:  MAC anesthesia Propofol      Prior to the procedure its objectives, risks, consequences and alternatives were discussed with the patient who then elected to proceed. The patient had the opportunity to ask questions and those questions were answered. A physical exam was performed. The heart, lungs, and mental status were examined prior to the procedure and found to be satisfactory for conscious sedation and for the procedure. Conscious sedation was initiated by the physician. Continuous pulse oximetry and blood pressure monitoring were used throughout the procedure. After appropriate pharyngeal anesthesia, the endoscope was passed into the esophagus without difficulty. The proximal esophagus is normal as is the distal esophagus. The scope was passed into the stomach without difficulty. The fundus was normal. In the body and antrum, there is gastric with multiple small benign appearing antral ulcers. Multiple biopsies were obtained. The pylorus, bulb and postbulbar area are unremarkable. On slow withdrawal of the scope, no abnormality was noted. Retroflexion revealed normal cardia and fundus. She tolerated the procedure without complications . Specimen Biopsies of antral ulcers    Complications: None. EBL:  None.     Jovani Moody MD  4/10/2017  1:51 PM

## 2017-04-10 NOTE — ANESTHESIA PREPROCEDURE EVALUATION
Anesthetic History   No history of anesthetic complications            Review of Systems / Medical History  Patient summary reviewed, nursing notes reviewed and pertinent labs reviewed    Pulmonary      Recent URI             Neuro/Psych   Within defined limits           Cardiovascular  Within defined limits                     GI/Hepatic/Renal  Within defined limits              Endo/Other        Anemia     Other Findings   Comments: Rheumatoid arthritis  Effects jaw and neck         Physical Exam    Airway  Mallampati: II  TM Distance: > 6 cm  Neck ROM: normal range of motion   Mouth opening: Normal     Cardiovascular  Regular rate and rhythm,  S1 and S2 normal,  no murmur, click, rub, or gallop             Dental  No notable dental hx    Comments: Missing molar   Pulmonary  Breath sounds clear to auscultation               Abdominal  GI exam deferred       Other Findings            Anesthetic Plan    ASA: 2  Anesthesia type: MAC          Induction: Intravenous  Anesthetic plan and risks discussed with: Patient

## 2017-04-10 NOTE — ANESTHESIA POSTPROCEDURE EVALUATION
Post-Anesthesia Evaluation and Assessment    Patient: Clarisa Lerma MRN: [id number]  SSN: [id number]    YOB: 1950  Age: 61 y.o. Sex: female       Cardiovascular Function/Vital Signs  Visit Vitals    /87    Pulse 82    Temp 37 °C (98.6 °F)    Resp 20    Ht 5' 4\" (1.626 m)    Wt 52.2 kg (115 lb)    SpO2 100%    BMI 19.74 kg/m2       Patient is status post MAC anesthesia for Procedure(s):  ESOPHAGOGASTRODUODENOSCOPY (EGD)  ESOPHAGOGASTRODUODENAL (EGD) BIOPSY. Nausea/Vomiting: None    Postoperative hydration reviewed and adequate. Pain:  Pain Scale 1: Numeric (0 - 10) (04/10/17 1354)  Pain Intensity 1: 0 (04/10/17 1354)   Managed    Neurological Status: At baseline    Mental Status and Level of Consciousness: Arousable    Pulmonary Status:   O2 Device: Room air (04/10/17 1354)   Adequate oxygenation and airway patent    Complications related to anesthesia: None    Post-anesthesia assessment completed.  No concerns    Signed By: Gary Alaniz MD     April 10, 2017

## 2017-04-10 NOTE — ROUTINE PROCESS
TRANSFER - OUT REPORT:    Verbal report given to Kathie Montes De Oca RN(name) on Alvin Reis  being transferred to obs(unit) for routine progression of care       Report consisted of patients Situation, Background, Assessment and   Recommendations(SBAR). Information from the following report(s) SBAR was reviewed with the receiving nurse. Lines:   Peripheral IV 04/10/17 Right Antecubital (Active)   Site Assessment Clean, dry, & intact 4/10/2017  1:35 PM   Phlebitis Assessment 0 4/10/2017  1:35 PM   Infiltration Assessment 0 4/10/2017  1:35 PM   Dressing Status Clean, dry, & intact 4/10/2017  1:35 PM   Dressing Type Tape;Transparent 4/10/2017  1:35 PM   Hub Color/Line Status Flushed;Patent 4/10/2017  1:35 PM        Opportunity for questions and clarification was provided.

## 2017-04-10 NOTE — IP AVS SNAPSHOT
[address] 
[phone] Patient: Alvin Reis MRN: [id number] :1950 You are allergic to the following Allergen Reactions Codeine Unknown (comments) Stomach upset Penicillin G Unknown (comments) Reaction as a child (doesn't remember) Sulfa (Sulfonamide Antibiotics) Unknown (comments) Blisters/welts around lips/mouth Tetracycline Unknown (comments) Raised welts around body and mainly back Recent Documentation Height Weight BMI OB Status Smoking Status 1.626 m 52.2 kg 19.74 kg/m2 Postmenopausal Never Smoker Emergency Contacts Name Discharge Info Relation Home Work Mobile Uri Bustos DISCHARGE CAREGIVER [3] Spouse [3] [phone] About your hospitalization You were admitted on:  April 10, 2017 [facility] You were discharged on:  2017 Unit phone number:  [phone] Why you were hospitalized Your primary diagnosis was:  Not on File Your diagnoses also included:  Pud (Peptic Ulcer Disease), Ra (Rheumatoid Arthritis) ([facility]) Providers Seen During Your Hospitalizations Provider Role Specialty Primary office phone Rachid Gibbons MD Attending Provider Emergency Medicine [phone] Ruby Huang MD Attending Provider Hospitalist [phone] Fiorella Anderson MD Attending Provider Internal Medicine [phone] Your Primary Care Physician (PCP) Primary Care Physician Office Phone Office Fax Porfirio Egan [phone] Follow-up Information Follow up With Details Comments Contact Info Chi Hackett MD In 1 week Hospital follow up  [address] [place] [address] 
[phone] Marcha Gilford, MD  Follow up in 4-6 weeks [address] [place] [address] [address] 1400 [address] [place] [address] 
[phone] Current Discharge Medication List  
  
START taking these medications Dose & Instructions Dispensing Information Comments Morning Noon Evening Bedtime  
 pantoprazole 40 mg tablet Commonly known as:  PROTONIX Your last dose was: Your next dose is:    
   
   
 Dose:  40 mg Take 1 Tab by mouth two (2) times a day. Indications: Gastric Ulcer Quantity:  60 Tab Refills:  0  
     
   
   
   
  
 prochlorperazine 5 mg tablet Commonly known as:  COMPAZINE Your last dose was: Your next dose is:    
   
   
 Dose:  5 mg Take 1 Tab by mouth every eight (8) hours as needed for up to 7 days. Quantity:  15 Tab Refills:  0  
     
   
   
   
  
 sucralfate 1 gram tablet Commonly known as:  Yemi Miu Your last dose was: Your next dose is:    
   
   
 Dose:  1 g Take 1 Tab by mouth Before breakfast, lunch, dinner and at bedtime for 14 days. Quantity:  56 Tab Refills:  0  
     
   
   
   
  
 traMADol 50 mg tablet Commonly known as:  ULTRAM  
   
Your last dose was: Your next dose is:    
   
   
 Dose:  50 mg Take 1 Tab by mouth every six (6) hours as needed for Pain. Max Daily Amount: 200 mg. Quantity:  20 Tab Refills:  0 CONTINUE these medications which have NOT CHANGED Dose & Instructions Dispensing Information Comments Morning Noon Evening Bedtime  
 codeine-butalbital-acetaminophen-caffeine -24-30 mg per capsule Commonly known as:  FIORICET WITH CODEINE Your last dose was: Your next dose is:    
   
   
 Dose:  1-2 Cap Take 1-2 Caps by mouth every four (4) hours as needed for Headache. Refills:  0  
     
   
   
   
  
 cyclobenzaprine 10 mg tablet Commonly known as:  FLEXERIL Your last dose was: Your next dose is:    
   
   
 Dose:  10 mg Take 10 mg by mouth two (2) times a day. Refills:  0 fish oil-omega-3 fatty acids 340-1,000 mg capsule Your last dose was: Your next dose is:    
   
   
 Dose:  1 Cap Take 1 Cap by mouth three (3) times daily. Refills:  0  
     
   
   
   
  
 fluorouracil 5 % chemo cream  
Commonly known as:  EFUDEX Your last dose was: Your next dose is:    
   
   
 Apply  to affected area two (2) times a day. forehead Refills:  0  
     
   
   
   
  
 folic acid 1 mg tablet Commonly known as:  [employer] Your last dose was: Your next dose is:    
   
   
 Dose:  4 mg Take 4 mg by mouth daily. Refills:  0 LORazepam 1 mg tablet Commonly known as:  ATIVAN Your last dose was: Your next dose is:    
   
   
 Dose:  1-2 mg Take 1-2 mg by mouth nightly. Refills:  0  
     
   
   
   
  
 methotrexate 2.5 mg tablet Commonly known as:  Vicci Reek Your last dose was: Your next dose is: Take 7.5 mg by mouth BID every Friday. Daily total = 15 mg every Friday. Refills:  0 PERCOCET 7.5-325 mg per tablet Generic drug:  oxyCODONE-acetaminophen Your last dose was: Your next dose is:    
   
   
 Dose:  1 Tab Take 1 Tab by mouth every eight (8) hours as needed for Pain. Refills:  0  
     
   
   
   
  
 RETAINE CMC 0.5 % Dpet Generic drug:  carboxymethylcellulose sodium Your last dose was: Your next dose is:    
   
   
 Dose:  1 Drop Administer 1 Drop to both eyes as needed. Uses drops 6-7 times a day. Refills:  0  
     
   
   
   
  
 TYLENOL EXTRA STRENGTH 500 mg tablet Generic drug:  acetaminophen Your last dose was: Your next dose is:    
   
   
 Dose:  500 mg Take 500 mg by mouth every six (6) hours as needed for Pain. Refills:  0 XELJANZ 5 mg tab Generic drug:  tofacitinib Your last dose was: Your next dose is: Dose:  5 mg Take 5 mg by mouth two (2) times a day. Refills:  0  
     
   
   
   
  
 XIIDRA 5 % Dpet Generic drug:  lifitegrast  
   
Your last dose was: Your next dose is:    
   
   
 Dose:  1 Drop Apply 1 Drop to eye two (2) times a day. Refills:  0 ZyrTEC 10 mg tablet Generic drug:  cetirizine Your last dose was: Your next dose is:    
   
   
 Dose:  10 mg Take 10 mg by mouth daily as needed for Allergies. Refills:  0 STOP taking these medications   
 aspirin-acetaminophen-caffeine 250-250-65 mg per tablet Commonly known as:  EXCEDRIN ES  
   
  
 etodolac 500 mg tablet Commonly known as:  LODINE Where to Get Your Medications Information on where to get these meds will be given to you by the nurse or doctor. ! Ask your nurse or doctor about these medications  
  pantoprazole 40 mg tablet  
 prochlorperazine 5 mg tablet  
 sucralfate 1 gram tablet  
 traMADol 50 mg tablet Discharge Instructions Discharge Instructions PATIENT ID: Yulia Mckeon MRN: [id number] YOB: 1950 DATE OF ADMISSION: 4/10/2017 11:46 AM   
DATE OF DISCHARGE: 4/11/2017 PRIMARY CARE PROVIDER: Sergey Avila MD  
 
ATTENDING PHYSICIAN: Mary Carmen Fowler MD 
DISCHARGING PROVIDER: Doretha Marroquin NP To contact this individual call [phone] and ask the  to page. If unavailable ask to be transferred the Adult Hospitalist Department. DISCHARGE DIAGNOSES Peptic Ulcers CONSULTATIONS: IP CONSULT TO HOSPITALIST 
 
PROCEDURES/SURGERIES: Procedure(s): ESOPHAGOGASTRODUODENOSCOPY (EGD) ESOPHAGOGASTRODUODENAL (EGD) BIOPSY PENDING TEST RESULTS:  
At the time of discharge the following test results are still pending: EGD biopsy FOLLOW UP APPOINTMENTS:  
Follow-up Information Follow up With Details Comments Contact Info Tabatha Brunner MD In 1 week [facility]  [facility] 
[phone] Jovani Moody MD  Follow up in 4-6 weeks [address] [place] [address] [address] 
[phone] ADDITIONAL CARE RECOMMENDATIONS:  
1. We have starting you on the following new medications: 
-Protonix and Carafate for your gastric ulcers.  
-Compazine for nausea. -Tramadol as need for pain in place of your stopped RA pain meds. Please follow up with your RA specialist as soon as possible for further medication recommendations. See below for more information to include common side effects. 2. Follow up with your pcp and Dr Natalie Carpio as listed above. DIET: Gi lite. Avoid fried spicy and greasy food. See below for more diet information. ACTIVITY: as tolerated WOUND CARE: none EQUIPMENT needed: none DISCHARGE MEDICATIONS: 
Tramadol (By mouth) Tramadol (TRAM-a-dol) Treats moderate to severe pain. This medicine is a narcotic pain reliever. Brand Name(s):ConZip, FusePaq Synapryn, Ryzolt, Ultram, Ultram ER, traMADol HCl There may be other brand names for this medicine. When This Medicine Should Not Be Used: This medicine is not right for everyone. Do not use if you had an allergic reaction to tramadol or other narcotic medicine. Tell your doctor if you have severe asthma or other breathing problems. How to Use This Medicine:  
Liquid, Capsule, Tablet, Dissolving Tablet, Long Acting Tablet · Take your medicine as directed. Your dose may need to be changed several times to find what works best for you. · Make sure your hands are dry before you handle the disintegrating tablet. Peel back the foil from the blister pack, then remove the tablet. Do not push the tablet through the foil. Place the tablet in your mouth. After it has melted, swallow or take a drink of water. · Swallow the extended-release tablet whole. Do not crush, break, or chew it. · Drink plenty of liquids to help avoid constipation. · Missed dose: Take a dose as soon as you remember. If it is almost time for your next dose, wait until then and take a regular dose. Do not take extra medicine to make up for a missed dose. · Store the medicine in a closed container at room temperature, away from heat, moisture, and direct light. Drugs and Foods to Avoid: Ask your doctor or pharmacist before using any other medicine, including over-the-counter medicines, vitamins, and herbal products. · Some medicines and foods can affect how tramadol works. Tell your doctor if you are using an MAO inhibitor or medicine for depression (such as amitriptyline, fluoxetine, paroxetine). · Tell your doctor if you are also using any of the following: 
¨ Denis's wort, digoxin, erythromycin, ketoconazole, linezolid, lithium, quinidine, rifampin, promethazine, carbamazepine, or cyclobenzaprine ¨ Blood thinner (such as warfarin), medicine for migraine headaches, or another narcotic medicine · Tell your doctor if you use anything else that makes you sleepy. Some examples are allergy medicine, narcotic pain medicine, and alcohol. · Do not drink alcohol while you are using this medicine. Warnings While Using This Medicine: · Tell your doctor if you are pregnant or breastfeeding, or if you have kidney disease, liver disease (including cirrhosis), or breathing problems. Tell your doctor if you have a history of head injury, seizures, drug addiction, or depression or similar emotional problems. · This medicine may cause the following problems: 
¨ High risk of overdose ¨ Serotonin syndrome · This medicine may make you dizzy or drowsy. Do not drive or doing anything else that could be dangerous until you know how this medicine affects you. · This medicine can be habit-forming. Do not use more than your prescribed dose. Call your doctor if you think your medicine is not working. · Do not stop using this medicine suddenly. Your doctor will need to slowly decrease your dose before you stop it completely. · Tell any doctor or dentist who treats you that you are using this medicine. · Some forms of this medicine contain phenylalanine (aspartame). · This medicine may cause constipation, especially with long-term use. Ask your doctor if you should use a laxative to prevent and treat constipation. · Keep all medicine out of the reach of children. Never share your medicine with anyone. Possible Side Effects While Using This Medicine:  
Call your doctor right away if you notice any of these side effects: · Allergic reaction: Itching or hives, swelling in your face or hands, swelling or tingling in your mouth or throat, chest tightness, trouble breathing · Anxiety, restlessness, fast heartbeat, fever, sweating, muscle spasms, nausea, vomiting, diarrhea, seeing or hearing things that are not there · Blistering, peeling, or red skin rash · Lightheadedness, dizziness, or fainting · Seizures · Severe sleepiness or unusual drowsiness · Trouble breathing If you notice these less serious side effects, talk with your doctor: · Feeling nervous or shaky · Headache · Mild itching · Nausea, vomiting, constipation, loss of appetite · Weakness If you notice other side effects that you think are caused by this medicine, tell your doctor. Call your doctor for medical advice about side effects. You may report side effects to FDA at 4-252-FDA-4186 © 2016 [address] is for End User's use only and may not be sold, redistributed or otherwise used for commercial purposes. The above information is an  only. It is not intended as medical advice for individual conditions or treatments. Talk to your doctor, nurse or pharmacist before following any medical regimen to see if it is safe and effective for you. Sucralfate (By mouth) Sucralfate (qhp-UFIA-voje) Treats ulcers. Brand Name(s):Carafate There may be other brand names for this medicine. When This Medicine Should Not Be Used: You should not use this medicine if you have had an allergic reaction to it. How to Use This Medicine:  
Tablet, Liquid · Your doctor will tell you how much to take and how often. · Do not stop taking the medicine unless your doctor tells you to, even if you feel better. · Take your medicine on an empty stomach. Swallow the tablet with a glass of water. · Unless your doctor tells you otherwise, take the medicine 1 hour before meals and at bedtime. · Measure the oral liquid medicine using a measuring spoon or medicine cup. · Shake the oral liquid medicine well before using. If a dose is missed: · Take your medicine as soon as you remember that you missed your dose. · If it is nearly time for your next dose, wait until then to take your medicine and skip the missed dose. · You should not use two doses at one time. How to Store and Dispose of This Medicine:  
· Keep the medicine at room temperature, away from heat, light, and moisture. Do not freeze the oral liquid. · Keep all medicine out of the reach of children. Drugs and Foods to Avoid: Ask your doctor or pharmacist before using any other medicine, including over-the-counter medicines, vitamins, and herbal products. · Antacids may be taken one-half hour before or after taking sucralfate. · You should not take other medicines within 2 hours before or after taking sucralfate. If you need help deciding the best times to take your other medicines, ask your doctor or pharmacist. 
Warnings While Using This Medicine: · If you are pregnant or breastfeeding, talk to your doctor before taking this medicine. · Check with your doctor before taking if you have kidney problems or are on dialysis. Possible Side Effects While Using This Medicine: Call your doctor right away if you notice any of these side effects: 
· Rash, hives, or itching · Swelling of the face or mouth If you notice these less serious side effects, talk with your doctor: · Constipation · Dry mouth · Mild stomach cramps, diarrhea · Upset stomach, gas · Dizziness If you notice other side effects that you think are caused by this medicine, tell your doctor. Call your doctor for medical advice about side effects. You may report side effects to FDA at 7-975-KKX-5022 © 2016 [address] is for End User's use only and may not be sold, redistributed or otherwise used for commercial purposes. The above information is an  only. It is not intended as medical advice for individual conditions or treatments. Talk to your doctor, nurse or pharmacist before following any medical regimen to see if it is safe and effective for you. Pantoprazole (By mouth) Pantoprazole (pan-TOE-pra-zole) Treats gastroesophageal reflux disease (GERD), a damaged esophagus, and high levels of stomach acid. This medicine is a proton pump inhibitor (PPI). Brand Name(s):Protonix There may be other brand names for this medicine. When This Medicine Should Not Be Used: This medicine is not right for everyone. Do not use it if you had an allergic reaction to pantoprazole. How to Use This Medicine:  
Packet, Tablet, Delayed Release Tablet, Long Acting Tablet · Your doctor will tell you how much medicine to use. Do not use more than directed. Take the medicine at least 30 minutes before a meal. 
· Delayed-release tablet: Swallow the tablet whole. Do not crush, break, or chew it. · Delayed-release packet: ¨ To prepare with applesauce: § Mix the packet contents with 1 teaspoon of applesauce. Do not mix with water, or other liquids or food. Do not divide the packet contents to make smaller doses. § Swallow the mixture within 10 minutes after you mix it. Do not chew or crush the granules. § Sip some water after you take the mixture to make sure you swallow all of the medicine. ¨ To prepare with apple juice: § Mix the packet contents with 1 teaspoon of apple juice in a small cup. Do not divide the packet contents to make smaller doses. § Stir for 5 seconds and drink the mixture immediately. Do not chew or crush the granules. § To make sure you get all of the medicine, add more apple juice to the cup. Drink it immediately. ¨ To prepare for a feeding tube: § Pour the packet contents in a 2-ounce (60 milliliter [mL]) catheter-tip syringe. § Add 10 mL of apple juice to the syringe. Add the mixture to the tube. Gently tap or shake the barrel of the syringe to help empty it. § Add 10 mL of apple juice to the syringe and put it in the tube. Do this at least 2 times. There should be no granules left in the syringe. · This medicine should come with a Medication Guide. Ask your pharmacist for a copy if you do not have one. · Missed dose: Take a dose as soon as you remember. If it is almost time for your next dose, wait until then and take a regular dose. Do not take extra medicine to make up for a missed dose. · Store the medicine in a closed container at room temperature, away from heat, moisture, and direct light. Drugs and Foods to Avoid: Ask your doctor or pharmacist before using any other medicine, including over-the-counter medicines, vitamins, and herbal products. · Some medicines can affect how pantoprazole works. Tell your doctor if you are using any of the following: ¨ Ampicillin, atazanavir, digoxin, erlotinib, ketoconazole, methotrexate, mycophenolate mofetil, or nelfinavir ¨ Blood thinner (such as warfarin) ¨ Diuretic (water pill) ¨ Iron supplements Warnings While Using This Medicine: · Tell your doctor if you are pregnant or breastfeeding, or if you have liver disease or osteoporosis. · This medicine may cause the following problems: ¨ Kidney problems ¨ Low vitamin B12 or magnesium levels ¨ Increased risk of broken bones in the hip, wrist, or spine · This medicine can cause diarrhea. Call your doctor if the diarrhea becomes severe, does not stop, or is bloody. Do not take any medicine to stop diarrhea until you have talked to your doctor. Diarrhea can occur 2 months or more after you stop taking this medicine. · Tell any doctor or dentist who treats you that you are using this medicine. This medicine may affect certain medical test results. · Your doctor will check your progress and the effects of this medicine at regular visits. Keep all appointments. · Keep all medicine out of the reach of children. Never share your medicine with anyone. Possible Side Effects While Using This Medicine:  
Call your doctor right away if you notice any of these side effects: · Allergic reaction: Itching or hives, swelling in your face or hands, swelling or tingling in your mouth or throat, chest tightness, trouble breathing · Blistering, peeling, red skin rash · Fever, joint pain, swelling in your body, unusual weight gain, change in how much or how often you urinate · Seizures, dizziness, uneven heartbeat, muscle cramps or twitching · Severe diarrhea, stomach cramps, fever · Stomach pain, nausea, vomiting, weight loss If you notice these less serious side effects, talk with your doctor: · Mild diarrhea If you notice other side effects that you think are caused by this medicine, tell your doctor. Call your doctor for medical advice about side effects. You may report side effects to FDA at 0-842-QXK-9487 © 2016 [address] is for End User's use only and may not be sold, redistributed or otherwise used for commercial purposes. The above information is an  only.  It is not intended as medical advice for individual conditions or treatments. Talk to your doctor, nurse or pharmacist before following any medical regimen to see if it is safe and effective for you. See Medication Reconciliation Form · It is important that you take the medication exactly as they are prescribed. · Keep your medication in the bottles provided by the pharmacist and keep a list of the medication names, dosages, and times to be taken in your wallet. · Do not take other medications without consulting your doctor. NOTIFY YOUR PHYSICIAN FOR ANY OF THE FOLLOWING:  
Fever over 101 degrees for 24 hours. Chest pain, shortness of breath, fever, chills, nausea, vomiting, diarrhea, change in mentation, falling, weakness, bleeding. Severe pain or pain not relieved by medications. Or, any other signs or symptoms that you may have questions about. DISPOSITION: 
X  Home With: 
 OT  PT  [facility]  RN  
  
 SNF/Inpatient Rehab/LTAC Independent/assisted living Hospice Other: PROBLEM LIST Updated: Yes X Signed:  
Torres Chandra NP 
4/11/2017 
12:32 PM 
 
 
Discharge Instructions Attachments/References PEPTIC ULCER DISEASE (ENGLISH) DYSPEPSIA (ENGLISH) Discharge Orders None [employer] Announcement We are excited to announce that we are making your provider's discharge notes available to you in [employer]. You will see these notes when they are completed and signed by the physician that discharged you from your recent hospital stay. If you have any questions or concerns about any information you see in [employer], please call the Health Information Department where you were seen or reach out to your Primary Care Provider for more information about your plan of care. Introducing [facility]! [employer] introduces [employer] patient portal. Now you can access parts of your medical record, email your doctor's office, and request medication refills online. 1. In your internet browser, go to [email or web address] 2. Click on the First Time User? Click Here link in the Sign In box. You will see the New Member Sign Up page. 3. Enter your [employer] Access Code exactly as it appears below. You will not need to use this code after youve completed the sign-up process. If you do not sign up before the expiration date, you must request a new code. · [employer] Access Code: [email or web address] Expires: 7/8/2017 10:18 PM 
 
4. Enter the last four digits of your Social Security Number (xxxx) and Date of Birth (mm/dd/yyyy) as indicated and click Submit. You will be taken to the next sign-up page. 5. Create a [employer] ID. This will be your [employer] login ID and cannot be changed, so think of one that is secure and easy to remember. 6. Create a [employer] password. You can change your password at any time. 7. Enter your Password Reset Question and Answer. This can be used at a later time if you forget your password. 8. Enter your e-mail address. You will receive e-mail notification when new information is available in [address] 9. Click Sign Up. You can now view and download portions of your medical record. 10. Click the Download Summary menu link to download a portable copy of your medical information. If you have questions, please visit the Frequently Asked Questions section of the [employer] website. Remember, [employer] is NOT to be used for urgent needs. For medical emergencies, dial 911. Now available from your iPhone and Android! General Information Please provide this summary of care documentation to your next provider. Patient Signature:  ____________________________________________________________ Date:  ____________________________________________________________  
  
Beverly Mcduffie Provider Signature:  ____________________________________________________________ Date:  ____________________________________________________________ More Information Peptic Ulcer Disease: Care Instructions Your Care Instructions Peptic ulcers are sores on the inside of the stomach or the small intestine. They are usually caused by an infection with bacteria or from use of nonsteroidal anti-inflammatory drugs (NSAIDs). NSAIDs include aspirin, ibuprofen (Advil), and naproxen (Aleve). Your doctor may have prescribed medicine to reduce stomach acid. You also may need to take antibiotics if your peptic ulcers are caused by an infection. You can help your stomach heal and keep ulcers from coming back by making some changes in your lifestyle. Quit smoking, limit caffeine and alcohol, and reduce stress. Follow-up care is a key part of your treatment and safety. Be sure to make and go to all appointments, and call your doctor if you are having problems. Its also a good idea to know your test results and keep a list of the medicines you take. How can you care for yourself at home? · Take your medicines exactly as prescribed. Call your doctor if you think you are having a problem with your medicine. · Do not take aspirin or other NSAIDs such as ibuprofen (Advil or Motrin) or naproxen (Aleve). Ask your doctor what you can take for pain. · Do not smoke. Smoking can make ulcers worse. If you need help quitting, talk to your doctor about stop-smoking programs and medicines. These can increase your chances of quitting for good. · Drink in moderation or avoid drinking alcohol. · Do not drink beverages that have caffeine if they bother your stomach. These include coffee, tea, and soda. · Eat a balanced diet of small, frequent meals. Make an appointment with a dietitian if you need help planning your meals. · Reduce stress. Avoid people and places that make you feel anxious, if you can. Learn ways to reduce stress, such as biofeedback, guided imagery, and meditation. When should you call for help? Call 911 anytime you think you may need emergency care. For example, call if: 
· You passed out (lost consciousness). · You vomit blood or what looks like coffee grounds. · You pass maroon or very bloody stools. Call your doctor now or seek immediate medical care if: 
· You have severe pain in your belly, back, or shoulders. · You have new or worsening belly pain. · You are dizzy or lightheaded, or you feel like you may faint. · Your stools are black and tarlike or have streaks of blood. Watch closely for changes in your health, and be sure to contact your doctor if: 
· You have new symptoms such as weight loss, nausea or vomiting. · You do not feel better as expected. Where can you learn more? Go to http://lina-zain.info/. Enter N891 in the search box to learn more about \"Peptic Ulcer Disease: Care Instructions. \" Current as of: August 9, 2016 Content Version: 11.2 © 2658-5140 [employer]. Care instructions adapted under license by [employer] (which disclaims liability or warranty for this information). If you have questions about a medical condition or this instruction, always ask your healthcare professional. [address] any warranty or liability for your use of this information. Indigestion (Dyspepsia or Heartburn): Care Instructions Your Care Instructions Sometimes it can be hard to pinpoint the cause of indigestion (dyspepsia or heartburn). Most cases of an upset stomach with bloating, burning, burping, and nausea are minor and go away within several hours. Home treatment and over-the-counter medicine often are able to control symptoms. But if you take medicine to relieve your indigestion without making diet and lifestyle changes, your symptoms are likely to return again and again.  
If you get indigestion often, it may be a sign of a more serious medical problem. Be sure to follow up with your doctor, who may want to do tests to be sure of the cause of your indigestion. Follow-up care is a key part of your treatment and safety. Be sure to make and go to all appointments, and call your doctor if you are having problems. It's also a good idea to know your test results and keep a list of the medicines you take. How can you care for yourself at home? · Your doctor may recommend over-the-counter medicine. For mild or occasional indigestion, antacids such as Tums, Gaviscon, Mylanta, or Maalox may help. Be careful when you take over-the-counter antacid medicines. Many of these medicines have aspirin in them. Read the label to make sure that you are not taking more than the recommended dose. Too much aspirin can be harmful. · Your doctor also may recommend over-the-counter acid reducers, such as Pepcid AC, Tagamet HB, Zantac 75, or Prilosec. Read and follow all instructions on the label. If you use these medicines often, talk with your doctor. · Change your eating habits. ¨ It's best to eat several small meals instead of two or three large meals. ¨ After you eat, wait 2 to 3 hours before you lie down. ¨ Chocolate, mint, and alcohol can make GERD worse. ¨ Spicy foods, foods that have a lot of acid (like tomatoes and oranges), and coffee can make GERD symptoms worse in some people. If your symptoms are worse after you eat a certain food, you may want to stop eating that food to see if your symptoms get better. · Do not smoke or chew tobacco. Smoking can make GERD worse. If you need help quitting, talk to your doctor about stop-smoking programs and medicines. These can increase your chances of quitting for good. · If you have GERD symptoms at night, raise the head of your bed 6 to 8 inches by putting the frame on blocks or placing a foam wedge under the head of your mattress. (Adding extra pillows does not work.) · Do not wear tight clothing around your middle. · Lose weight if you need to. Losing just 5 to 10 pounds can help. · Do not take anti-inflammatory medicines, such as aspirin, ibuprofen (Advil, Motrin), or naproxen (Aleve). These can irritate the stomach. If you need a pain medicine, try acetaminophen (Tylenol), which does not cause stomach upset. When should you call for help? Call 911 anytime you think you may need emergency care. For example, call if: 
· You passed out (lost consciousness). · You vomit blood or what looks like coffee grounds. · You pass maroon or very bloody stools. · You have chest pain or pressure. This may occur with: ¨ Sweating. ¨ Shortness of breath. ¨ Nausea or vomiting. ¨ Pain that spreads from the chest to the neck, jaw, or one or both shoulders or arms. ¨ Feeling dizzy or lightheaded. ¨ A fast or uneven pulse. After calling 911, chew 1 adult-strength aspirin. Wait for an ambulance. Do not try to drive yourself. Call your doctor now or seek immediate medical care if: 
· You have severe belly pain. · Your stools are black and tarlike or have streaks of blood. · You have trouble swallowing. · You are losing weight and do not know why. Watch closely for changes in your health, and be sure to contact your doctor if: 
· You do not get better as expected. Where can you learn more? Go to http://lina-zain.info/. Enter V370 in the search box to learn more about \"Indigestion (Dyspepsia or Heartburn): Care Instructions. \" Current as of: November 16, 2016 Content Version: 11.2 © 3938-7872 [employer]. Care instructions adapted under license by [employer] (which disclaims liability or warranty for this information). If you have questions about a medical condition or this instruction, always ask your healthcare professional. [address] any warranty or liability for your use of this information.

## 2017-04-11 VITALS
RESPIRATION RATE: 16 BRPM | HEART RATE: 68 BPM | TEMPERATURE: 98.1 F | WEIGHT: 115 LBS | OXYGEN SATURATION: 96 % | BODY MASS INDEX: 19.63 KG/M2 | HEIGHT: 64 IN | DIASTOLIC BLOOD PRESSURE: 68 MMHG | SYSTOLIC BLOOD PRESSURE: 118 MMHG

## 2017-04-11 PROBLEM — K27.9 PUD (PEPTIC ULCER DISEASE): Status: ACTIVE | Noted: 2017-04-11

## 2017-04-11 PROBLEM — M06.9 RA (RHEUMATOID ARTHRITIS) (HCC): Status: ACTIVE | Noted: 2017-04-11

## 2017-04-11 LAB
ANION GAP BLD CALC-SCNC: 6 MMOL/L (ref 5–15)
BASOPHILS # BLD AUTO: 0 K/UL (ref 0–0.1)
BASOPHILS # BLD: 0 % (ref 0–1)
BUN SERPL-MCNC: 6 MG/DL (ref 6–20)
BUN/CREAT SERPL: 10 (ref 12–20)
CALCIUM SERPL-MCNC: 7.8 MG/DL (ref 8.5–10.1)
CHLORIDE SERPL-SCNC: 107 MMOL/L (ref 97–108)
CO2 SERPL-SCNC: 26 MMOL/L (ref 21–32)
CREAT SERPL-MCNC: 0.61 MG/DL (ref 0.55–1.02)
EOSINOPHIL # BLD: 0.1 K/UL (ref 0–0.4)
EOSINOPHIL NFR BLD: 2 % (ref 0–7)
ERYTHROCYTE [DISTWIDTH] IN BLOOD BY AUTOMATED COUNT: 13.9 % (ref 11.5–14.5)
GLUCOSE SERPL-MCNC: 90 MG/DL (ref 65–100)
HCT VFR BLD AUTO: 30.6 % (ref 35–47)
HGB BLD-MCNC: 10.1 G/DL (ref 11.5–16)
LIPASE SERPL-CCNC: 124 U/L (ref 73–393)
LYMPHOCYTES # BLD AUTO: 24 % (ref 12–49)
LYMPHOCYTES # BLD: 1.3 K/UL (ref 0.8–3.5)
MCH RBC QN AUTO: 31.8 PG (ref 26–34)
MCHC RBC AUTO-ENTMCNC: 33 G/DL (ref 30–36.5)
MCV RBC AUTO: 96.2 FL (ref 80–99)
MONOCYTES # BLD: 0.5 K/UL (ref 0–1)
MONOCYTES NFR BLD AUTO: 9 % (ref 5–13)
NEUTS SEG # BLD: 3.4 K/UL (ref 1.8–8)
NEUTS SEG NFR BLD AUTO: 65 % (ref 32–75)
PLATELET # BLD AUTO: 257 K/UL (ref 150–400)
POTASSIUM SERPL-SCNC: 3.3 MMOL/L (ref 3.5–5.1)
RBC # BLD AUTO: 3.18 M/UL (ref 3.8–5.2)
SODIUM SERPL-SCNC: 139 MMOL/L (ref 136–145)
WBC # BLD AUTO: 5.3 K/UL (ref 3.6–11)

## 2017-04-11 PROCEDURE — 85025 COMPLETE CBC W/AUTO DIFF WBC: CPT | Performed by: NURSE PRACTITIONER

## 2017-04-11 PROCEDURE — 74011000250 HC RX REV CODE- 250: Performed by: NURSE PRACTITIONER

## 2017-04-11 PROCEDURE — 74011250636 HC RX REV CODE- 250/636: Performed by: NURSE PRACTITIONER

## 2017-04-11 PROCEDURE — 96376 TX/PRO/DX INJ SAME DRUG ADON: CPT

## 2017-04-11 PROCEDURE — C9113 INJ PANTOPRAZOLE SODIUM, VIA: HCPCS | Performed by: NURSE PRACTITIONER

## 2017-04-11 PROCEDURE — 99218 HC RM OBSERVATION: CPT

## 2017-04-11 PROCEDURE — 83690 ASSAY OF LIPASE: CPT | Performed by: NURSE PRACTITIONER

## 2017-04-11 PROCEDURE — 80048 BASIC METABOLIC PNL TOTAL CA: CPT | Performed by: NURSE PRACTITIONER

## 2017-04-11 PROCEDURE — 36415 COLL VENOUS BLD VENIPUNCTURE: CPT | Performed by: NURSE PRACTITIONER

## 2017-04-11 PROCEDURE — 96361 HYDRATE IV INFUSION ADD-ON: CPT

## 2017-04-11 PROCEDURE — 74011250637 HC RX REV CODE- 250/637: Performed by: NURSE PRACTITIONER

## 2017-04-11 RX ORDER — PROCHLORPERAZINE MALEATE 10 MG
5 TABLET ORAL
Status: DISCONTINUED | OUTPATIENT
Start: 2017-04-11 | End: 2017-04-11 | Stop reason: HOSPADM

## 2017-04-11 RX ORDER — SUCRALFATE 1 G/1
1 TABLET ORAL
Qty: 56 TAB | Refills: 0 | Status: SHIPPED | OUTPATIENT
Start: 2017-04-11 | End: 2017-04-25

## 2017-04-11 RX ORDER — PANTOPRAZOLE SODIUM 40 MG/1
40 TABLET, DELAYED RELEASE ORAL 2 TIMES DAILY
Qty: 60 TAB | Refills: 0 | Status: SHIPPED | OUTPATIENT
Start: 2017-04-11

## 2017-04-11 RX ORDER — TRAMADOL HYDROCHLORIDE 50 MG/1
50 TABLET ORAL
Qty: 20 TAB | Refills: 0 | Status: SHIPPED | OUTPATIENT
Start: 2017-04-11 | End: 2021-01-11 | Stop reason: CLARIF

## 2017-04-11 RX ORDER — PROCHLORPERAZINE MALEATE 5 MG
5 TABLET ORAL
Qty: 15 TAB | Refills: 0 | Status: SHIPPED | OUTPATIENT
Start: 2017-04-11 | End: 2017-04-18

## 2017-04-11 RX ADMIN — BUTALBITAL, ACETAMINOPHEN AND CAFFEINE 1 TABLET: 50; 325; 40 TABLET ORAL at 09:13

## 2017-04-11 RX ADMIN — SODIUM CHLORIDE 40 MG: 9 INJECTION INTRAMUSCULAR; INTRAVENOUS; SUBCUTANEOUS at 09:09

## 2017-04-11 RX ADMIN — HYDROMORPHONE HYDROCHLORIDE 1 MG: 1 INJECTION, SOLUTION INTRAMUSCULAR; INTRAVENOUS; SUBCUTANEOUS at 04:39

## 2017-04-11 RX ADMIN — SODIUM CHLORIDE 125 ML/HR: 900 INJECTION, SOLUTION INTRAVENOUS at 04:36

## 2017-04-11 RX ADMIN — SUCRALFATE 1 G: 1 TABLET ORAL at 07:15

## 2017-04-11 RX ADMIN — SUCRALFATE 1 G: 1 TABLET ORAL at 12:14

## 2017-04-11 RX ADMIN — PROCHLORPERAZINE MALEATE 5 MG: 10 TABLET, FILM COATED ORAL at 10:09

## 2017-04-11 RX ADMIN — Medication 10 ML: at 07:16

## 2017-04-11 RX ADMIN — Medication 1 CAPSULE: at 09:13

## 2017-04-11 RX ADMIN — FOLIC ACID 4 MG: 1 TABLET ORAL at 09:14

## 2017-04-11 NOTE — PROGRESS NOTES
Went over discharge instructions with pt and . Rx given. Went over Rx with pt and . Pt is aware what medications she needs to stop. Pt to follow up with PCP, GI and her RA MD.  IV discontinued. Pt dressed. Pt ambulatory at time of discharge with .

## 2017-04-11 NOTE — DISCHARGE INSTRUCTIONS
Discharge Instructions       PATIENT ID: Eva Cagle  MRN: [id number]   YOB: 1950    DATE OF ADMISSION: 4/10/2017 11:46 AM    DATE OF DISCHARGE: 4/11/2017    PRIMARY CARE PROVIDER: Naveen Benz MD     ATTENDING PHYSICIAN: Margarette Marrero MD  DISCHARGING PROVIDER: Adelaida Maradiaga NP    To contact this individual call [phone] and ask the  to page. If unavailable ask to be transferred the Adult Hospitalist Department. DISCHARGE DIAGNOSES Peptic Ulcers    CONSULTATIONS: IP CONSULT TO HOSPITALIST    PROCEDURES/SURGERIES: Procedure(s):  ESOPHAGOGASTRODUODENOSCOPY (EGD)  ESOPHAGOGASTRODUODENAL (EGD) BIOPSY    PENDING TEST RESULTS:   At the time of discharge the following test results are still pending: EGD biopsy    FOLLOW UP APPOINTMENTS:   Follow-up Information     Follow up With Details Comments Contact Info    Naveen Benz MD In 1 week Hospital follow up  [address]      Alyssia Vega MD  Follow up in 4-6 weeks [address]             ADDITIONAL CARE RECOMMENDATIONS:   1. We have starting you on the following new medications:  -Protonix and Carafate for your gastric ulcers.   -Compazine for nausea. -Tramadol as need for pain in place of your stopped RA pain meds. Please follow up with your RA specialist as soon as possible for further medication recommendations. See below for more information to include common side effects. 2. Follow up with your pcp and Dr Brice Snider as listed above. DIET: Gi lite. Avoid fried spicy and greasy food. See below for more diet information. ACTIVITY: as tolerated    WOUND CARE: none    EQUIPMENT needed: none      DISCHARGE MEDICATIONS:  Tramadol (By mouth)   Tramadol (TRAM-a-dol)  Treats moderate to severe pain. This medicine is a narcotic pain reliever.    Brand Name(s):ConZip, FusePaq Synapryn, Ryzolt, Ultram, Ultram ER, traMADol HCl   There may be other brand names for this medicine. When This Medicine Should Not Be Used: This medicine is not right for everyone. Do not use if you had an allergic reaction to tramadol or other narcotic medicine. Tell your doctor if you have severe asthma or other breathing problems. How to Use This Medicine:   Liquid, Capsule, Tablet, Dissolving Tablet, Long Acting Tablet  · Take your medicine as directed. Your dose may need to be changed several times to find what works best for you. · Make sure your hands are dry before you handle the disintegrating tablet. Peel back the foil from the blister pack, then remove the tablet. Do not push the tablet through the foil. Place the tablet in your mouth. After it has melted, swallow or take a drink of water. · Swallow the extended-release tablet whole. Do not crush, break, or chew it. · Drink plenty of liquids to help avoid constipation. · Missed dose: Take a dose as soon as you remember. If it is almost time for your next dose, wait until then and take a regular dose. Do not take extra medicine to make up for a missed dose. · Store the medicine in a closed container at room temperature, away from heat, moisture, and direct light. Drugs and Foods to Avoid:   Ask your doctor or pharmacist before using any other medicine, including over-the-counter medicines, vitamins, and herbal products. · Some medicines and foods can affect how tramadol works. Tell your doctor if you are using an MAO inhibitor or medicine for depression (such as amitriptyline, fluoxetine, paroxetine). · Tell your doctor if you are also using any of the following:  ¨ Denis's wort, digoxin, erythromycin, ketoconazole, linezolid, lithium, quinidine, rifampin, promethazine, carbamazepine, or cyclobenzaprine  ¨ Blood thinner (such as warfarin), medicine for migraine headaches, or another narcotic medicine  · Tell your doctor if you use anything else that makes you sleepy.  Some examples are allergy medicine, narcotic pain medicine, and alcohol. · Do not drink alcohol while you are using this medicine. Warnings While Using This Medicine:   · Tell your doctor if you are pregnant or breastfeeding, or if you have kidney disease, liver disease (including cirrhosis), or breathing problems. Tell your doctor if you have a history of head injury, seizures, drug addiction, or depression or similar emotional problems. · This medicine may cause the following problems:  ¨ High risk of overdose  ¨ Serotonin syndrome  · This medicine may make you dizzy or drowsy. Do not drive or doing anything else that could be dangerous until you know how this medicine affects you. · This medicine can be habit-forming. Do not use more than your prescribed dose. Call your doctor if you think your medicine is not working. · Do not stop using this medicine suddenly. Your doctor will need to slowly decrease your dose before you stop it completely. · Tell any doctor or dentist who treats you that you are using this medicine. · Some forms of this medicine contain phenylalanine (aspartame). · This medicine may cause constipation, especially with long-term use. Ask your doctor if you should use a laxative to prevent and treat constipation. · Keep all medicine out of the reach of children. Never share your medicine with anyone.   Possible Side Effects While Using This Medicine:   Call your doctor right away if you notice any of these side effects:  · Allergic reaction: Itching or hives, swelling in your face or hands, swelling or tingling in your mouth or throat, chest tightness, trouble breathing  · Anxiety, restlessness, fast heartbeat, fever, sweating, muscle spasms, nausea, vomiting, diarrhea, seeing or hearing things that are not there  · Blistering, peeling, or red skin rash  · Lightheadedness, dizziness, or fainting  · Seizures  · Severe sleepiness or unusual drowsiness  · Trouble breathing  If you notice these less serious side effects, talk with your doctor:   · Feeling nervous or shaky  · Headache  · Mild itching  · Nausea, vomiting, constipation, loss of appetite  · Weakness  If you notice other side effects that you think are caused by this medicine, tell your doctor. Call your doctor for medical advice about side effects. You may report side effects to FDA at 6-528-HBL-9498  © 2016 [address] is for End User's use only and may not be sold, redistributed or otherwise used for commercial purposes. The above information is an  only. It is not intended as medical advice for individual conditions or treatments. Talk to your doctor, nurse or pharmacist before following any medical regimen to see if it is safe and effective for you. Sucralfate (By mouth)   Sucralfate ([id number]-GQTL-[id number])  Treats ulcers. Brand Name(s):Carafate   There may be other brand names for this medicine. When This Medicine Should Not Be Used: You should not use this medicine if you have had an allergic reaction to it. How to Use This Medicine:   Tablet, Liquid  · Your doctor will tell you how much to take and how often. · Do not stop taking the medicine unless your doctor tells you to, even if you feel better. · Take your medicine on an empty stomach. Swallow the tablet with a glass of water. · Unless your doctor tells you otherwise, take the medicine 1 hour before meals and at bedtime. · Measure the oral liquid medicine using a measuring spoon or medicine cup. · Shake the oral liquid medicine well before using. If a dose is missed:   · Take your medicine as soon as you remember that you missed your dose. · If it is nearly time for your next dose, wait until then to take your medicine and skip the missed dose. · You should not use two doses at one time. How to Store and Dispose of This Medicine:   · Keep the medicine at room temperature, away from heat, light, and moisture.  Do not freeze the oral liquid. · Keep all medicine out of the reach of children. Drugs and Foods to Avoid:   Ask your doctor or pharmacist before using any other medicine, including over-the-counter medicines, vitamins, and herbal products. · Antacids may be taken one-half hour before or after taking sucralfate. · You should not take other medicines within 2 hours before or after taking sucralfate. If you need help deciding the best times to take your other medicines, ask your doctor or pharmacist.  Warnings While Using This Medicine:   · If you are pregnant or breastfeeding, talk to your doctor before taking this medicine. · Check with your doctor before taking if you have kidney problems or are on dialysis. Possible Side Effects While Using This Medicine:   Call your doctor right away if you notice any of these side effects:  · Rash, hives, or itching  · Swelling of the face or mouth  If you notice these less serious side effects, talk with your doctor:   · Constipation  · Dry mouth  · Mild stomach cramps, diarrhea  · Upset stomach, gas  · Dizziness  If you notice other side effects that you think are caused by this medicine, tell your doctor. Call your doctor for medical advice about side effects. You may report side effects to FDA at 3-152-FDA-0596  © 2016 [address] is for End User's use only and may not be sold, redistributed or otherwise used for commercial purposes. The above information is an  only. It is not intended as medical advice for individual conditions or treatments. Talk to your doctor, nurse or pharmacist before following any medical regimen to see if it is safe and effective for you. Pantoprazole (By mouth)   Pantoprazole (pan-TOE-pra-zole)  Treats gastroesophageal reflux disease (GERD), a damaged esophagus, and high levels of stomach acid. This medicine is a proton pump inhibitor (PPI).    Brand Name(s):Protonix   There may be other brand names for this medicine. When This Medicine Should Not Be Used: This medicine is not right for everyone. Do not use it if you had an allergic reaction to pantoprazole. How to Use This Medicine:   Packet, Tablet, Delayed Release Tablet, Long Acting Tablet  · Your doctor will tell you how much medicine to use. Do not use more than directed. Take the medicine at least 30 minutes before a meal.  · Delayed-release tablet: Swallow the tablet whole. Do not crush, break, or chew it. · Delayed-release packet:   ¨ To prepare with applesauce:   § Mix the packet contents with 1 teaspoon of applesauce. Do not mix with water, or other liquids or food. Do not divide the packet contents to make smaller doses. § Swallow the mixture within 10 minutes after you mix it. Do not chew or crush the granules. § Sip some water after you take the mixture to make sure you swallow all of the medicine. ¨ To prepare with apple juice:   § Mix the packet contents with 1 teaspoon of apple juice in a small cup. Do not divide the packet contents to make smaller doses. § Stir for 5 seconds and drink the mixture immediately. Do not chew or crush the granules. § To make sure you get all of the medicine, add more apple juice to the cup. Drink it immediately. ¨ To prepare for a feeding tube:   § Pour the packet contents in a 2-ounce (60 milliliter [mL]) catheter-tip syringe. § Add 10 mL of apple juice to the syringe. Add the mixture to the tube. Gently tap or shake the barrel of the syringe to help empty it. § Add 10 mL of apple juice to the syringe and put it in the tube. Do this at least 2 times. There should be no granules left in the syringe. · This medicine should come with a Medication Guide. Ask your pharmacist for a copy if you do not have one. · Missed dose: Take a dose as soon as you remember. If it is almost time for your next dose, wait until then and take a regular dose. Do not take extra medicine to make up for a missed dose.   · Store the medicine in a closed container at room temperature, away from heat, moisture, and direct light. Drugs and Foods to Avoid:   Ask your doctor or pharmacist before using any other medicine, including over-the-counter medicines, vitamins, and herbal products. · Some medicines can affect how pantoprazole works. Tell your doctor if you are using any of the following:   ¨ Ampicillin, atazanavir, digoxin, erlotinib, ketoconazole, methotrexate, mycophenolate mofetil, or nelfinavir  ¨ Blood thinner (such as warfarin)  ¨ Diuretic (water pill)  ¨ Iron supplements  Warnings While Using This Medicine:   · Tell your doctor if you are pregnant or breastfeeding, or if you have liver disease or osteoporosis. · This medicine may cause the following problems:  ¨ Kidney problems  ¨ Low vitamin B12 or magnesium levels  ¨ Increased risk of broken bones in the hip, wrist, or spine  · This medicine can cause diarrhea. Call your doctor if the diarrhea becomes severe, does not stop, or is bloody. Do not take any medicine to stop diarrhea until you have talked to your doctor. Diarrhea can occur 2 months or more after you stop taking this medicine. · Tell any doctor or dentist who treats you that you are using this medicine. This medicine may affect certain medical test results. · Your doctor will check your progress and the effects of this medicine at regular visits. Keep all appointments. · Keep all medicine out of the reach of children. Never share your medicine with anyone.   Possible Side Effects While Using This Medicine:   Call your doctor right away if you notice any of these side effects:  · Allergic reaction: Itching or hives, swelling in your face or hands, swelling or tingling in your mouth or throat, chest tightness, trouble breathing  · Blistering, peeling, red skin rash  · Fever, joint pain, swelling in your body, unusual weight gain, change in how much or how often you urinate  · Seizures, dizziness, uneven heartbeat, muscle cramps or twitching  · Severe diarrhea, stomach cramps, fever  · Stomach pain, nausea, vomiting, weight loss  If you notice these less serious side effects, talk with your doctor:   · Mild diarrhea  If you notice other side effects that you think are caused by this medicine, tell your doctor. Call your doctor for medical advice about side effects. You may report side effects to FDA at 1-678-OAV-6588  © 2016 [address] is for End User's use only and may not be sold, redistributed or otherwise used for commercial purposes. The above information is an  only. It is not intended as medical advice for individual conditions or treatments. Talk to your doctor, nurse or pharmacist before following any medical regimen to see if it is safe and effective for you. See Medication Reconciliation Form    · It is important that you take the medication exactly as they are prescribed. · Keep your medication in the bottles provided by the pharmacist and keep a list of the medication names, dosages, and times to be taken in your wallet. · Do not take other medications without consulting your doctor. NOTIFY YOUR PHYSICIAN FOR ANY OF THE FOLLOWING:   Fever over 101 degrees for 24 hours. Chest pain, shortness of breath, fever, chills, nausea, vomiting, diarrhea, change in mentation, falling, weakness, bleeding. Severe pain or pain not relieved by medications. Or, any other signs or symptoms that you may have questions about. DISPOSITION:  X  Home With:   OT  PT  HH  RN       SNF/Inpatient Rehab/LTAC    Independent/assisted living    Hospice    Other:       PROBLEM LIST Updated:   Yes X       Signed:   Maribel Martinez NP  4/11/2017  12:32 PM

## 2017-04-11 NOTE — PROGRESS NOTES
Problem: Patient Education: Go to Patient Education Activity  Goal: Patient/Family Education  Outcome: Progressing Towards Goal  Patient educated on fall risk and pain control call bell in reach    Comments:   Patient educated on the risk for falls while taking Dilaudid pain medication her call bell is in reach

## 2017-04-11 NOTE — DISCHARGE SUMMARY
Discharge Summary       PATIENT ID: Harsh Harrison  MRN: [id number]   YOB: 1950    DATE OF ADMISSION: 4/10/2017 11:46 AM    DATE OF DISCHARGE: 4/11/2017   PRIMARY CARE PROVIDER: Esdras Jaime MD     ATTENDING PHYSICIAN: Jeanette La MD  DISCHARGING PROVIDER: Tammy Ku NP    To contact this individual call [phone] and ask the  to page. If unavailable ask to be transferred the Adult Hospitalist Department. CONSULTATIONS: IP CONSULT TO HOSPITALIST    PROCEDURES/SURGERIES: Procedure(s):  ESOPHAGOGASTRODUODENOSCOPY (EGD)  ESOPHAGOGASTRODUODENAL (EGD) BIOPSY    ADMITTING [address] [place] [address] COURSE:   Dx: PUD, RA    Harsh Harrison is a 61 y.o. female with past medical history as documented below  who presents with acute onset abdominal pain. This patient reports that she was in her usual state of chronic health until two days PTA when she began having acute diffuse abdominal pain associated with chills, vomiting, anorexia, and generalize malaise. She presented to the ER and was evaluated via Abdominal CT scan( 4/10/17) that was suggestive of gastroenteritis likely attributable to nonspecific infection or inflammation. There is no bowel obstruction. She was discharged from ER but her pain returned described as unbearable with continued associated symptoms as well as an exacerbation of her chronic headaches. She returned to the ER and underwent a EGD with biposies by Dr. Jg Yoder that identified multiple gastric ulcers. Her further laboratory work up Hgb 11.1, WBC 6.8 , plt 310 , , K 4.1. She was given IVF and IV analgesic she is now admitted to the observation for continue monitoring post procedure and pain control. Pt tolerating po diet. Pt stable for discharge home. DISCHARGE DIAGNOSES / PLAN:      1. Abdominal pain s/p EGD with gastric ulcers  - Avoid NSAID, ETOH  - PPI/ carafate  - pain control  - Gi lite diet  - GI consult- f/u with Dr Jg Yoder OP    2. Rheumatologic disorders/ Autoimmune disease   - F/u with outpatient provider  - stop NSAIDs, gave short course of Tramadol       PENDING TEST RESULTS:   At the time of discharge the following test results are still pending: EGD biopsy     FOLLOW UP APPOINTMENTS:    Follow-up Information     Follow up With Details Comments Contact Info    Liat Kim MD In 1 week Hospital follow up  [address] [facility]      Aris Juan MD  Follow up in 4-6 weeks [address]:   1. We have starting you on the following new medications:  -Protonix and Carafate for your gastric ulcers.   -Compazine for nausea. -Tramadol as need for pain in place of your stopped RA pain meds. Please follow up with your RA specialist as soon as possible for further medication recommendations. See below for more information to include common side effects. 2. Follow up with your pcp and Dr Froilan Chen as listed above. DIET: Gi lite. Avoid fried spicy and greasy food. See below for more diet information. ACTIVITY: as tolerated    WOUND CARE: none    EQUIPMENT needed: none    DISCHARGE MEDICATIONS:  Discharge Medication List as of 4/11/2017 12:45 PM      START taking these medications    Details   prochlorperazine (COMPAZINE) 5 mg tablet Take 1 Tab by mouth every eight (8) hours as needed for up to 7 days. , Print, Disp-15 Tab, R-0      sucralfate (CARAFATE) 1 gram tablet Take 1 Tab by mouth Before breakfast, lunch, dinner and at bedtime for 14 days. , Print, Disp-56 Tab, R-0      traMADol (ULTRAM) 50 mg tablet Take 1 Tab by mouth every six (6) hours as needed for Pain. Max Daily Amount: 200 mg., Print, Disp-20 Tab, R-0      pantoprazole (PROTONIX) 40 mg tablet Take 1 Tab by mouth two (2) times a day.  Indications: Gastric Ulcer, Print, Disp-60 Tab, R-0         CONTINUE these medications which have NOT CHANGED    Details fluorouracil (EFUDEX) 5 % chemo cream Apply  to affected area two (2) times a day. forehead, Historical Med      lifitegrast (XIIDRA) 5 % dpet Apply 1 Drop to eye two (2) times a day., Historical Med      LORazepam (ATIVAN) 1 mg tablet Take 1-2 mg by mouth nightly., Historical Med      codeine-butalbital-acetaminophen-caffeine (FIORICET WITH CODEINE) -55-30 mg per capsule Take 1-2 Caps by mouth every four (4) hours as needed for Headache., Historical Med      acetaminophen (TYLENOL EXTRA STRENGTH) 500 mg tablet Take 500 mg by mouth every six (6) hours as needed for Pain., Historical Med      oxyCODONE-acetaminophen (PERCOCET) 7.5-325 mg per tablet Take 1 Tab by mouth every eight (8) hours as needed for Pain., Historical Med      cetirizine (ZYRTEC) 10 mg tablet Take 10 mg by mouth daily as needed for Allergies. , Historical Med      fish oil-omega-3 fatty acids 340-1,000 mg capsule Take 1 Cap by mouth three (3) times daily. , Historical Med      carboxymethylcellulose sodium (RETAINE CMC) 0.5 % dpet Administer 1 Drop to both eyes as needed. Uses drops 6-7 times a day., Historical Med      tofacitinib (XELJANZ) 5 mg tab Take 5 mg by mouth two (2) times a day., Historical Med      methotrexate (RHEUMATREX) 2.5 mg tablet Take 7.5 mg by mouth BID every Friday. Daily total = 15 mg every Friday., Historical Med      folic acid (FOLVITE) 1 mg tablet Take 4 mg by mouth daily. , Historical Med      cyclobenzaprine (FLEXERIL) 10 mg tablet Take 10 mg by mouth two (2) times a day., Historical Med         STOP taking these medications       aspirin-acetaminophen-caffeine (EXCEDRIN ES) 250-250-65 mg per tablet Comments:   Reason for Stopping:         etodolac (LODINE) 500 mg tablet Comments:   Reason for Stopping:                 NOTIFY YOUR PHYSICIAN FOR ANY OF THE FOLLOWING:   Fever over 101 degrees for 24 hours.    Chest pain, shortness of breath, fever, chills, nausea, vomiting, diarrhea, change in mentation, falling, weakness, bleeding. Severe pain or pain not relieved by medications. Or, any other signs or symptoms that you may have questions about. DISPOSITION:   X Home With:   OT  PT  HH  RN       Long term SNF/Inpatient Rehab    Independent/assisted living    Hospice    Other:       PATIENT CONDITION AT DISCHARGE:     Functional status    Poor     Deconditioned    X Independent      Cognition    XX Lucid     Forgetful     Dementia      Catheters/lines (plus indication)    Thompson     PICC     PEG    X None      Code status   X Full code     DNR      PHYSICAL EXAMINATION AT DISCHARGE:  General: No acute distress, cooperative, pleasant   EENT: EOMI. Anicteric sclerae. Oral mucous moist, oropharynx benign  Resp: CTA bilaterally. No wheezing/rhonchi/rales. No accessory muscle use  CV: Regular rhythm, normal rate, no murmurs, gallops, rubs  GI: Soft, non distended, non tender. normoactive bowel sounds,   Extremities: No edema, warm, 2+ pulses throughout  Neurologic: Moves all extremities. AAOx3, CN II-XII grossly intact  Psych: Good insight. Not anxious nor agitated.   Skin: Good Turgor, no rashes or ulcers      CHRONIC MEDICAL DIAGNOSES:  Problem List as of 4/11/2017  Date Reviewed: 4/11/2017          Codes Class Noted - Resolved    PUD (peptic ulcer disease) ICD-10-CM: K27.9  ICD-9-CM: 533.90  4/11/2017 - Present        RA (rheumatoid arthritis) ([facility] ICD-10-CM: M06.9  ICD-9-CM: 714.0  4/11/2017 - Present              Greater than 30 minutes were spent with the patient on counseling and coordination of care    Signed:   Adelaida Maradiaga NP  4/11/2017  12:41 PM

## 2017-04-11 NOTE — PROGRESS NOTES
Patient resting in bed she has abdominal pain 3/10  And it is soft and tender. Will continue to monitor patient for changes in her condition. 2036 patient pain is creeping up it is now 5/10 and is uncomfortable and says the pain is increasing pain medication given. 2330 patient is resting in bed she still has abdominal pain but denies having any nausea at this time. Will continue to monitor patient for changes in her condition. 0430 patient is resting in bed she has abdominal pain 5/10 pain medication to be given. Blood drawn and sent to lab.    0730 Bedside and Verbal shift change report given to Jose Serrano RN  (oncoming nurse) by Jazmin Pozo RN  (offgoing nurse). Report included the following information SBAR, MAR, Recent Results and Med Rec Status.

## 2017-04-11 NOTE — PROGRESS NOTES
[address] [facility] [address] [place] [address], [address] [facility]    Assessment:  1. Multiple gastric ulcers  2. RA  3. Ankylosing Spondylitis    Plan:  1. Home after lunch, on protonix bid, zofran and ultram for pain     Clarisa Lerma is a  61 y.o.  female who we are following for severe abdominal pain, nausea and vomiting. She is much improved today. EGD revealed multiple gastric ulcers. I think she is ready for discharge on above meds. Past Medical History:   Diagnosis Date    Autoimmune disease ([address]     rheumatoid arthritis    Ill-defined condition     Spondylosis     Past Surgical History:   Procedure Laterality Date    HX APPENDECTOMY      HX GYN      right ovary and fallopian tube removed    HX ORTHOPAEDIC      back surgery     Allergies   Allergen Reactions    Codeine Unknown (comments)     Stomach upset    Penicillin G Unknown (comments)     Reaction as a child (doesn't remember)    Sulfa (Sulfonamide Antibiotics) Unknown (comments)     Blisters/welts around lips/mouth    Tetracycline Unknown (comments)     Raised welts around body and mainly back     Current Facility-Administered Medications   Medication Dose Route Frequency Provider Last Rate Last Dose    prochlorperazine (COMPAZINE) tablet 5 mg  5 mg Oral Q6H PRN Stevphen Mantle, NP   5 mg at 04/11/17 1009    acetaminophen (TYLENOL) tablet 500 mg  500 mg Oral Q6H PRN Luis E Files, NP        cetirizine (ZYRTEC) tablet 10 mg  10 mg Oral DAILY PRN Luis E Files, NP        cyclobenzaprine (FLEXERIL) tablet 10 mg  10 mg Oral BID Luis E Files, NP        fish oil-omega-3 fatty acids 340-1,000 mg capsule 1 Cap  1 Cap Oral TID Luis E Files, NP   1 Cap at 04/11/17 0913    . PHARMACY TO SUBSTITUTE PER PROTOCOL    Per Protocol Luis E Files, NP        folic acid (FOLVITE) tablet 4 mg  4 mg Oral DAILY Luis E Files, NP   4 mg at 04/11/17 0914    . PHARMACY TO SUBSTITUTE PER PROTOCOL Per Protocol Giana Parikh NP        LORazepam (ATIVAN) tablet 1-2 mg  1-2 mg Oral QHS Giana Niarmando, NP   1 mg at 04/10/17 2114    . PHARMACY TO SUBSTITUTE PER PROTOCOL    Per Protocol Giana Parikh NP        sodium chloride (NS) flush 5-10 mL  5-10 mL IntraVENous Q8H Giana Niece, NP   10 mL at 04/11/17 0716    sodium chloride (NS) flush 5-10 mL  5-10 mL IntraVENous PRN Giana Parikh, NP        butalbital-acetaminophen-caffeine (FIORICET, ESGIC) -40 mg per tablet 1 Tab  1 Tab Oral Q4H PRN Giana Jl, NP   1 Tab at 04/11/17 0913    pantoprazole (PROTONIX) 40 mg in sodium chloride 0.9 % 10 mL injection  40 mg IntraVENous Q12H Giana Jl, NP   40 mg at 04/11/17 0909    sucralfate (CARAFATE) tablet 1 g  1 g Oral AC&HS Giana Parikh, NP   1 g at 04/11/17 0715     ROS- No change from initial consultation    Visit Vitals    /68 (BP 1 Location: Left arm, BP Patient Position: At rest)    Pulse 68    Temp 98.1 °F (36.7 °C)    Resp 16    Ht 5' 4\" (1.626 m)    Wt 52.2 kg (115 lb)    SpO2 96%    BMI 19.74 kg/m2           PHYSICAL EXAM:  General: WD, WN. Alert, cooperative, no acute distress    HEENT: NC, Atraumatic. PERRLA, EOMI. Anicteric sclerae. Lungs:  CTA Bilaterally. No Wheezing/Rhonchi/Rales. Heart:  Regular  rhythm,  No murmur (), No Rubs, No Gallops  Abdomen: Soft, Non distended, Non tender.  +Bowel sounds, no HSM  Extremities: No c/c/e  Neurologic:  CN 2-12 gi, Alert and oriented X 3. No acute neurological distress   Psych:   Good insight. Not anxious nor agitated.       Brianna Thibodeaux MD  4/11/2017  11:14 AM

## 2018-04-23 ENCOUNTER — HOSPITAL ENCOUNTER (OUTPATIENT)
Dept: NUCLEAR MEDICINE | Age: 68
Discharge: HOME | End: 2018-04-23
Attending: FAMILY MEDICINE
Payer: MEDICARE

## 2018-04-23 VITALS — BODY MASS INDEX: 20.6 KG/M2 | WEIGHT: 120 LBS

## 2018-04-23 DIAGNOSIS — R10.11 RIGHT UPPER QUADRANT PAIN: ICD-10-CM

## 2018-04-23 PROCEDURE — 74011250636 HC RX REV CODE- 250/636: Performed by: FAMILY MEDICINE

## 2018-04-23 PROCEDURE — 78227 HEPATOBIL SYST IMAGE W/DRUG: CPT

## 2018-04-23 PROCEDURE — 74011000258 HC RX REV CODE- 258: Performed by: FAMILY MEDICINE

## 2018-04-23 RX ORDER — SODIUM CHLORIDE 0.9 % (FLUSH) 0.9 %
10 SYRINGE (ML) INJECTION
Status: COMPLETED | OUTPATIENT
Start: 2018-04-23 | End: 2018-04-23

## 2018-04-23 RX ORDER — SODIUM CHLORIDE 9 MG/ML
25 INJECTION, SOLUTION INTRAVENOUS
Status: COMPLETED | OUTPATIENT
Start: 2018-04-23 | End: 2018-04-23

## 2018-04-23 RX ADMIN — SODIUM CHLORIDE 25 ML: 900 INJECTION, SOLUTION INTRAVENOUS at 12:30

## 2018-04-23 RX ADMIN — Medication 10 ML: at 11:25

## 2018-04-23 RX ADMIN — SINCALIDE 1.09 MCG: 5 INJECTION, POWDER, LYOPHILIZED, FOR SOLUTION INTRAVENOUS at 12:30

## 2020-09-30 NOTE — PROGRESS NOTES
Neurology Note    Patient ID:  Zaid Portillo  [id number]  81 y.o.  1950      Date of Consultation:  October 1, 2020    Referring Physician: Dr. Rashmi Neff  Reason for Consultation:  headaches    Subjective: I have a lot of neck pain and headaches. History of Present Illness:   Zaid Portillo is a 81 y.o. female who presents to the neurology clinic at [facility] for evaluation. She was previously receiving care for her headaches and neck pain at the [facility]. She was last seen there just over 2 years ago. She has headaches which she describes as stabbing in nature at her left eye. It does cause difficulty with sleep. The headaches can be blinding in nature. She does have severe amount of neck pain which is a notable trigger for her headaches. She used to take muscle relaxants which had helped to only a minimal degree. There was also concerns of side effects associated with her muscle relaxant. She states that her neck pain, stiffness and discomfort have worsened over the past year. She does note that stress, anxiety, and change in her sleeping do worsen her symptoms. She had an previously diagnosed with a cervical dystonia as the primary etiology for her neck symptoms but she was not interested in botulinum toxin at that time. Over the past 2 years, she does continue to be followed for her rheumatoid arthritis and was continuing to take Remicade injections. Those have been increased in frequency. She has been to see orthopedic surgery and did have knee surgery performed. Through the diagnostic work-up, a x-ray of her cervical spine which was performed which did show worsening of the curvature of her spine. She did see her dentist and said there was loss of her jaw joint present bilaterally. She used to take Lorazepam every night which was helpful for her but this has not been covered by her insurance.   She has decreased the amount of lorazepam to 1 pill a day. There was also an element of a cervical dystonia that was present. It was noted previously that she had a left shoulder lift, decreased right rotation and decrease head tilt to the left. She does state that occasionally she gets off balance. She did have one fall and a concussion approximately 12 months ago. She still does work as an academic writer but it is hard for her to concentrate and can never work more than 3 hours a day due to pain in her neck, stiffness, which then inhibits her ability to concentrate and work. She does use Fiorinal which seems to be helping some. She rarely uses narcotics. She is quite tearful at times during the visit today due to the impact this is having on her quality of life. She still does try to be active and does walk 3 to 4 miles every day. She also does meditation daily. Past Medical History:   Diagnosis Date    Autoimmune disease ([facility]     rheumatoid arthritis    Ill-defined condition     Spondylosis    Osteoarthritis     Skin cancer         Past Surgical History:   Procedure Laterality Date    HX APPENDECTOMY      HX GYN      right ovary and fallopian tube removed    HX KNEE ARTHROSCOPY Left     HX ORTHOPAEDIC      back surgery        Family History   Problem Relation Age of Onset    Cancer Father         Social History     Tobacco Use    Smoking status: Former Smoker    Smokeless tobacco: Never Used   Substance Use Topics    Alcohol use: Not on file     Comment: a glass of wine per night        Allergies   Allergen Reactions    Codeine Unknown (comments)     Stomach upset    Penicillin G Unknown (comments)     Reaction as a child (doesn't remember)    Sulfa (Sulfonamide Antibiotics) Unknown (comments)     Blisters/welts around lips/mouth    Tetracycline Unknown (comments)     Raised welts around body and mainly back        Prior to Admission medications    Medication Sig Start Date End Date Taking?  Authorizing Provider   infliximab (REMICADE IV) Remicade   Yes Provider, Historical   escitalopram oxalate (LEXAPRO) 5 mg tablet TAKE 1 AND 1/2 TABLETS BY MOUTH EVERY DAY AT BEDTIME 8/27/20  Yes Provider, Historical   LORazepam (ATIVAN) 1 mg tablet Take 1-2 mg by mouth nightly. Yes Provider, Historical   codeine-butalbital-acetaminophen-caffeine (FIORICET WITH CODEINE) -99-30 mg per capsule Take 1-2 Caps by mouth every four (4) hours as needed for Headache. Yes Provider, Historical   acetaminophen (TYLENOL EXTRA STRENGTH) 500 mg tablet Take 500 mg by mouth every six (6) hours as needed for Pain. Yes Provider, Historical   oxyCODONE-acetaminophen (PERCOCET) 7.5-325 mg per tablet Take 1 Tab by mouth every eight (8) hours as needed for Pain. Yes Provider, Historical   cetirizine (ZYRTEC) 10 mg tablet Take 10 mg by mouth daily as needed for Allergies. Yes Provider, Historical   methotrexate (RHEUMATREX) 2.5 mg tablet Daily total = 18.5 mg every Friday. Yes Other, MD Pola   folic acid (FOLVITE) 1 mg tablet Take 4 mg by mouth daily. Yes Other, MD Pola   ramipriL (ALTACE) 2.5 mg capsule TAKE 1 CAPSULE BY MOUTH EVERY DAY 9/16/20   Provider, Historical   traMADol (ULTRAM) 50 mg tablet Take 1 Tab by mouth every six (6) hours as needed for Pain. Max Daily Amount: 200 mg. 4/11/17   Prakash Donovan NP   pantoprazole (PROTONIX) 40 mg tablet Take 1 Tab by mouth two (2) times a day. Indications: Gastric Ulcer 4/11/17   Prakash Donovan NP   fluorouracil (EFUDEX) 5 % chemo cream Apply  to affected area two (2) times a day. forehead    Provider, Historical   lifitegrast (XIIDRA) 5 % dpet Apply 1 Drop to eye two (2) times a day. Provider, Historical   fish oil-omega-3 fatty acids 340-1,000 mg capsule Take 1 Cap by mouth three (3) times daily. Provider, Historical   carboxymethylcellulose sodium (RETAINE CMC) 0.5 % dpet Administer 1 Drop to both eyes as needed. Uses drops 6-7 times a day.     Provider, Historical tofacitinib (XELJANZ) 5 mg tab Take 5 mg by mouth two (2) times a day. Provider, Historical   cyclobenzaprine (FLEXERIL) 10 mg tablet Take 10 mg by mouth two (2) times a day. Provider, Historical       Review of Systems:    General, constitutional: neck pain and stiffness  Eyes, vision: negative  Ears, nose, throat: negative  Cardiovascular, heart: negative  Respiratory: negative  Gastrointestinal: negative  Genitourinary: negative  Musculoskeletal: negative  Skin and integumentary: negative  Psychiatric: negative  Endocrine: negative  Neurological: negative, except for HPI  Hematologic/lymphatic: negative  Allergy/immunology: negative    Objective: There were no vitals taken for this visit. There were no vital signs obtained as this was a telemedicine, virtual visit. Physical Exam:      General:  appears well nourished in no acute distress. She does become tearful at times during the visit. Lungs: no labored breathing. Skin: intact    Neurological exam:    Awake, alert, oriented to person, place and time  Recent and remote memory were normal  Attention and concentration were intact  Language was intact. There was no aphasia  Speech: no dysarthria  Fund of knowledge was preserved    Cranial nerves:   II-XII were tested    Visual fields were full  Eomi, no evidence of nystagmus  Facial motor: normal and symmetric  Hearing intact  SCM strength intact  Tongue: midline without fasciculations    Motor:   No pronator drift    left shoulder lift. She does have decreased range of motion in her neck in all spheres, but notable decreased right rotation and decrease head tilt to the left. No evidence of fasciculations    Strength testing:  Appears full      Sensory:  No complaints of sensory disturbance. Reflexes:  No assessed via telemedicine. Cerebellar testing:  no tremor apparent, finger/nose and avinash were intact    Romberg: absent    Gait: steady.      Labs:     Lab Results Component Value Date/Time    Sodium 139 04/11/2017 04:06 AM    Potassium 3.3 (L) 04/11/2017 04:06 AM    Chloride 107 04/11/2017 04:06 AM    Glucose 90 04/11/2017 04:06 AM    BUN 6 04/11/2017 04:06 AM    Creatinine 0.61 04/11/2017 04:06 AM    Calcium 7.8 (L) 04/11/2017 04:06 AM    WBC 5.3 04/11/2017 04:06 AM    HCT 30.6 (L) 04/11/2017 04:06 AM    HGB 10.1 (L) 04/11/2017 04:06 AM    PLATELET 080 99/68/2212 04:06 AM       Imaging:    No results found for this or any previous visit. Results from [place] encounter on 04/09/17   CT ABD PELV W CONT    Narrative EXAM:  CT abdomen pelvis with contrast    INDICATION: Upper abdominal pain for 2 hours. COMPARISON: CT 8/1/2007. TECHNIQUE: Helical CT of the abdomen  and pelvis  following the uneventful  intravenous administration of nonionic contrast.  Coronal and sagittal reformats  are performed. CT dose reduction was achieved through use of a standardized  protocol tailored for this examination and automatic exposure control for dose  modulation. Adaptive statistical iterative reconstruction (ASIR) was utilized. FINDINGS:   The visualized lung bases demonstrate no mass or consolidation. There is a  stable benign 3 mm nodule in the left lower lobe. The heart size is normal.  There is no pericardial or pleural effusion. The liver, spleen, pancreas, and adrenal glands are normal. The kidneys are  symmetric without hydronephrosis. The gall bladder is present  without intra- or  extra-hepatic biliary dilatation. There is distal gastric wall thickening with mild adjacent fluid and fat  stranding. There are multiple fluid-filled but nondilated small bowel loops. There are no dilated bowel loops. The appendix is not well seen. There are no  enlarged lymph nodes. There is no free air. The aorta is tortuous but tapers  without aneurysm. The urinary bladder is normal.  There is no pelvic mass. The bony structures are age-appropriate. Impression IMPRESSION:   Findings suggestive of gastroenteritis likely attributable to nonspecific  infection or inflammation. There is no bowel obstruction. Assessment and Plan:    The patient is a pleasant 82-year-old female with significant neck and shoulder pain with resultant headaches. Her examination does reveal preserved strength and intact reflexes. She does have decreased range of motion in her cervical spine and a cervical dystonia that is present. Cervical dystonia:    She has failed multiple oral medications and physical therapy. She continues to do stretching daily. Given the severity of symptoms and impact this has had to daily life as well as ineffectiveness of oral medications, botulinum toxin therapy would be indicated for her. She had discussed this before and was trying to avoid it but recognizes that given the amount of symptoms she is having that she is very much interested in pursuing this medication. I will write the prescription and get it off to her pharmacy in regards to getting authorization. This will be done under EMG guidance. Muscles will include trapezius, levator scapula, scalene, scm, and splenius capitus    Chronic headaches:  She will continue with Fiorinal.    Risk factors for migraines were reviewed with the patient. These include lifestyle modifications, improving exercise, receiving adequate sleep, and monitoring for food triggers. A patient log of migraine frequency, intensity, and possible triggers should be recorded. Arthritis and multiple musculoskeletal complaints and degenerative spine disease :  Continue to follow with rheumatology       Patient Active Problem List   Diagnosis Code    PUD (peptic ulcer disease) K27.9    RA (rheumatoid arthritis) ([facility] M06.9      The patient should return to clinic for botulinum toxin injections.      Renewed medication:yes                 Signed By:  Zahira Bar DO FAAN    October 1, 2020

## 2020-10-01 ENCOUNTER — TELEPHONE (OUTPATIENT)
Dept: NEUROLOGY | Age: 70
End: 2020-10-01

## 2020-10-01 ENCOUNTER — OFFICE VISIT (OUTPATIENT)
Dept: NEUROLOGY | Age: 70
End: 2020-10-01
Payer: MEDICARE

## 2020-10-01 DIAGNOSIS — G24.3 CERVICAL DYSTONIA: Primary | ICD-10-CM

## 2020-10-01 PROCEDURE — 99205 OFFICE O/P NEW HI 60 MIN: CPT | Performed by: PSYCHIATRY & NEUROLOGY

## 2020-10-01 RX ORDER — RAMIPRIL 2.5 MG/1
5 CAPSULE ORAL DAILY
COMMUNITY
Start: 2020-09-16

## 2020-10-01 RX ORDER — ESCITALOPRAM OXALATE 5 MG/1
TABLET ORAL
COMMUNITY
Start: 2020-08-27

## 2020-10-01 NOTE — PROGRESS NOTES
Reviewed record in preparation for visit and have obtained necessary documentation. Identified pt with two pt identifiers(name and ). No chief complaint on file. Health Maintenance Due   Topic Date Due    Hepatitis C Screening  1950    DTaP/Tdap/Td series (1 - Tdap) 1973    Lipid Screen  1992    Shingrix Vaccine Age 50> (1 of 2) 2002    Breast Cancer Screen Mammogram  2002    FOBT Q1Y Age 50-75  2002    GLAUCOMA SCREENING Q2Y  2017    Bone Densitometry (Dexa) Screening  2017    Pneumococcal 65+ years (1 of 1 - PPSV23) 2017    Medicare Yearly Exam  2018    Flu Vaccine (1) 2020       Ms. Chantell Noonan has a reminder for a \"due or due soon\" health maintenance. I have asked that she discuss health maintenance topic(s) due with Her  primary care provider.     Coordination of Care Questionnaire:  :     1) Have you been to an emergency room, urgent care clinic since your last visit? no   Hospitalized since your last visit? no             2) Have you seen or consulted any other health care providers outside of [address] [place] [address] since your last visit? no  (Include any pap smears or colon screenings in this section.)

## 2020-10-01 NOTE — TELEPHONE ENCOUNTER
Botox 100 units for Cervical Dystonia    Approved by [facility]   Case [id number]  9/1/20 - 10/1/2021.      [facility]

## 2020-10-02 ENCOUNTER — TELEPHONE (OUTPATIENT)
Dept: NEUROLOGY | Age: 70
End: 2020-10-02

## 2020-10-02 NOTE — TELEPHONE ENCOUNTER
Emailed Botox Rx through secure mail to our [address] [facility] [phone]  [email or web address]     Requested delivery on 10/6 for 10/12 appt.

## 2020-10-06 ENCOUNTER — TELEPHONE (OUTPATIENT)
Dept: NEUROLOGY | Age: 70
End: 2020-10-06

## 2020-10-06 NOTE — TELEPHONE ENCOUNTER
Re: Botox     Patient left vm, she has contacted [facility] and given her consent to ship med. Rec'd email from [phone] at [facility] - she has s/w patient and has set up delivery for 10/8/20.

## 2020-10-06 NOTE — TELEPHONE ENCOUNTER
Re: Botox     Patient needs to give consent to ship. [facility] has made two calls to her. I called today and left vm to call me w/ any questions and to ck her voice mail for pharmacy phone number she needs to call today. Her appt is 10/12.

## 2020-10-08 ENCOUNTER — DOCUMENTATION ONLY (OUTPATIENT)
Dept: NEUROLOGY | Age: 70
End: 2020-10-08

## 2020-10-08 NOTE — PROGRESS NOTES
botox came in the office: 10/8/2020  MFR: allergjose alfredo  LOT: [id number]  Exp: 4/2023  Appointment: 10/12/2020 Jarret  Specialty pharmacy: [facility]

## 2020-10-09 NOTE — PROGRESS NOTES
Neurology Note    Patient ID:  Brett Johnson  [id number]  81 y.o.  1950      Date of Consultati on:  October 12, 2020         Subjective: I am here for Botox    History of Present Illness:   Brett Johnson is a 81 y.o. female who returns to the neurology clinic at [facility] for an evaluation. She was initially seen on October 1, 2020. Please see my history of present illness, examination, and treatment base plan from that day. She has a history of cervical dystonia and worsening neck pain, decreased range of motion and headaches. She returns today for her botulinum toxin injections    She was last here, she did notice 1 worsening bout of neck pain on the left side with spasms up into the side of her head. She also did have one episode of radiating low back pain. Past Medical History:   Diagnosis Date    Autoimmune disease ([address]     rheumatoid arthritis    Ill-defined condition     Spondylosis    Osteoarthritis     Skin cancer         Past Surgical History:   Procedure Laterality Date    HX APPENDECTOMY      HX GYN      right ovary and fallopian tube removed    HX KNEE ARTHROSCOPY Left     HX ORTHOPAEDIC      back surgery        Family History   Problem Relation Age of Onset    Cancer Father         Social History     Tobacco Use    Smoking status: Former Smoker    Smokeless tobacco: Never Used   Substance Use Topics    Alcohol use: Not on file     Comment: a glass of wine per night        Allergies   Allergen Reactions    Codeine Unknown (comments)     Stomach upset    Penicillin G Unknown (comments)     Reaction as a child (doesn't remember)    Sulfa (Sulfonamide Antibiotics) Unknown (comments)     Blisters/welts around lips/mouth    Tetracycline Unknown (comments)     Raised welts around body and mainly back        Prior to Admission medications    Medication Sig Start Date End Date Taking?  Authorizing Provider   methylPREDNISolone (Medrol, [place],) 4 mg tablet Take  by mouth. Yes Provider, Historical   infliximab (REMICADE IV) Remicade   Yes Provider, Historical   ramipriL (ALTACE) 2.5 mg capsule TAKE 1 CAPSULE BY MOUTH EVERY DAY 9/16/20  Yes Provider, Historical   escitalopram oxalate (LEXAPRO) 5 mg tablet TAKE 1 AND 1/2 TABLETS BY MOUTH EVERY DAY AT BEDTIME 8/27/20  Yes Provider, Historical   onabotulinumtoxinA (BOTOX) 100 unit injection 100 Units by IntraMUSCular route every three (3) months. Cervical dystonia. G24.3.  emg guided injections to the muscles of her neck bilaterally  Indications: twisting neck spasms 10/1/20  Yes Kwame Wheat DO   LORazepam (ATIVAN) 1 mg tablet Take 1-2 mg by mouth nightly. Yes Provider, Historical   codeine-butalbital-acetaminophen-caffeine (FIORICET WITH CODEINE) -03-30 mg per capsule Take 1-2 Caps by mouth every four (4) hours as needed for Headache. Yes Provider, Historical   acetaminophen (TYLENOL EXTRA STRENGTH) 500 mg tablet Take 500 mg by mouth every six (6) hours as needed for Pain. Yes Provider, Historical   oxyCODONE-acetaminophen (PERCOCET) 7.5-325 mg per tablet Take 1 Tab by mouth every eight (8) hours as needed for Pain. Yes Provider, Historical   cetirizine (ZYRTEC) 10 mg tablet Take 10 mg by mouth daily as needed for Allergies. Yes Provider, Historical   fish oil-omega-3 fatty acids 340-1,000 mg capsule Take 1 Cap by mouth three (3) times daily. Yes Provider, Historical   carboxymethylcellulose sodium (RETAINE CMC) 0.5 % dpet Administer 1 Drop to both eyes as needed. Uses drops 6-7 times a day. Yes Provider, Historical   methotrexate (RHEUMATREX) 2.5 mg tablet Daily total = 18.5 mg every Friday. Yes Other, MD Pola   folic acid (FOLVITE) 1 mg tablet Take 4 mg by mouth daily. Yes Other, MD Pola   traMADol (ULTRAM) 50 mg tablet Take 1 Tab by mouth every six (6) hours as needed for Pain.  Max Daily Amount: 200 mg. 4/11/17   Gabby Owen NP   pantoprazole (PROTONIX) 40 mg tablet Take 1 Tab by mouth two (2) times a day. Indications: Gastric Ulcer 4/11/17   Aida Megan, NP   fluorouracil (EFUDEX) 5 % chemo cream Apply  to affected area two (2) times a day. forehead    Provider, Historical   lifitegrast (XIIDRA) 5 % dpet Apply 1 Drop to eye two (2) times a day. Provider, Historical   tofacitinib (XELJANZ) 5 mg tab Take 5 mg by mouth two (2) times a day. Provider, Historical   cyclobenzaprine (FLEXERIL) 10 mg tablet Take 10 mg by mouth two (2) times a day. Provider, Historical       Review of Systems:    General, constitutional: neck pain and stiffness  Eyes, vision: negative  Ears, nose, throat: negative  Cardiovascular, heart: negative  Respiratory: negative  Gastrointestinal: negative  Genitourinary: negative  Musculoskeletal: negative  Skin and integumentary: negative  Psychiatric: negative  Endocrine: negative  Neurological: negative, except for HPI  Hematologic/lymphatic: negative  Allergy/immunology: negative    Objective:     Visit Vitals  /82 (BP 1 Location: Right arm, BP Patient Position: Sitting)   Pulse 71   Temp 97.7 °F (36.5 °C)   Resp 16   Ht 5' 4.5\" (1.638 m)   Wt 116 lb (52.6 kg)   SpO2 98%   BMI 19.60 kg/m²        There were no vital signs obtained as this was a telemedicine, virtual visit. Physical Exam:      General:  appears well nourished in no acute distress. Lungs: no labored breathing. Skin: intact    Neurological exam:    Awake, alert, oriented to person, place and time  Recent and remote memory were normal  Attention and concentration were intact  Language was intact. There was no aphasia  Speech: no dysarthria  Fund of knowledge was preserved    Cranial nerves:   II-XII were tested    Visual fields were full  Eomi, no evidence of nystagmus  Facial motor: normal and symmetric  Hearing intact  SCM strength intact  Tongue: midline without fasciculations    Motor:   No pronator drift    left shoulder lift.  She does have decreased range of motion in her neck in all spheres, but notable decreased right rotation and decrease head tilt to the left. She does have a right laterocollis and right rotation      No evidence of fasciculations    Strength testin out of 5 throughout    Gait: steady. Labs:     Lab Results   Component Value Date/Time    Sodium 139 2017 04:06 AM    Potassium 3.3 (L) 2017 04:06 AM    Chloride 107 2017 04:06 AM    Glucose 90 2017 04:06 AM    BUN 6 2017 04:06 AM    Creatinine 0.61 2017 04:06 AM    Calcium 7.8 (L) 2017 04:06 AM    WBC 5.3 2017 04:06 AM    HCT 30.6 (L) 2017 04:06 AM    HGB 10.1 (L) 2017 04:06 AM    PLATELET 716  04:06 AM       Imaging:    No results found for this or any previous visit. Results from [place] encounter on 17   CT ABD PELV W CONT    Narrative EXAM:  CT abdomen pelvis with contrast    INDICATION: Upper abdominal pain for 2 hours. COMPARISON: CT 2007. TECHNIQUE: Helical CT of the abdomen  and pelvis  following the uneventful  intravenous administration of nonionic contrast.  Coronal and sagittal reformats  are performed. CT dose reduction was achieved through use of a standardized  protocol tailored for this examination and automatic exposure control for dose  modulation. Adaptive statistical iterative reconstruction (ASIR) was utilized. FINDINGS:   The visualized lung bases demonstrate no mass or consolidation. There is a  stable benign 3 mm nodule in the left lower lobe. The heart size is normal.  There is no pericardial or pleural effusion. The liver, spleen, pancreas, and adrenal glands are normal. The kidneys are  symmetric without hydronephrosis. The gall bladder is present  without intra- or  extra-hepatic biliary dilatation. There is distal gastric wall thickening with mild adjacent fluid and fat  stranding. There are multiple fluid-filled but nondilated small bowel loops.   There are no dilated bowel loops.  The appendix is not well seen. There are no  enlarged lymph nodes. There is no free air. The aorta is tortuous but tapers  without aneurysm. The urinary bladder is normal.  There is no pelvic mass. The bony structures are age-appropriate. Impression IMPRESSION:   Findings suggestive of gastroenteritis likely attributable to nonspecific  infection or inflammation. There is no bowel obstruction. Assessment and Plan:    The patient is a pleasant 24-year-old female with significant neck and shoulder pain with resultant headaches. Her examination does reveal preserved strength and intact reflexes. She does have decreased range of motion in her cervical spine and a cervical dystonia that is present. Cervical dystonia:    She has failed multiple oral medications and physical therapy. She continues to do stretching daily. Given the severity of symptoms and impact this has had to daily life as well as ineffectiveness of oral medications, botulinum toxin therapy would be indicated for her. I plan to injection   70 units   Units for the diagnosis of cervical dystonia    Consent performed and placed in medical records    Timeout was performed    All injections were performed under emg guidance unless otherwise indicated    Each vial of botulinum toxin was reconstituted with 2 cc of normal saline    Lot number:  [id number]  Expiration date:  4/2023    Procedure:botulinum toxin injections. EMG guidance was utilized for all injections unless otherwise noted.      Right side:    Muscle Number of units Number of sites Total units injected   Sternocleidomastoid      Spenius Capitus 20 1 20   Levator Scapulae      Scalene 15 1 15   Splenius Cervicus      Longissimus      Trapezius            Left side:     Muscle Number of units Number of sites Total units injected   Sternocleidomastoid      Spenius Capitus      Levator Scapulae 20 1 20   Scalene      Splenius Cervicus      Longissimus Trapezius 15 1 15       Amount of botulinum toxin injected:70 units    Amount of wastage: 30 units    Total amount of botulinum toxin:  100 units    The patient tolerated the procedure and there was no immediate complications. Blood loss minimal.      The patient was told to call the office or go to the nearest emergency room if side effects arise    The patient will return in 3 months for re-evaluation and consideration of future injections    Chronic headaches:  She will continue with Fiorinal.      Arthritis and multiple musculoskeletal complaints and degenerative spine disease :  Continue to follow with rheumatology       Patient Active Problem List   Diagnosis Code    PUD (peptic ulcer disease) K27.9    RA (rheumatoid arthritis) ([address] [facility] [facility] M06.9      The patient should return to clinic for botulinum toxin injections.      Renewed medication:yes                 Signed By:  Harinder Horn DO FAAN    October 12, 2020

## 2020-10-12 ENCOUNTER — OFFICE VISIT (OUTPATIENT)
Dept: NEUROLOGY | Age: 70
End: 2020-10-12
Payer: MEDICARE

## 2020-10-12 VITALS
BODY MASS INDEX: 19.33 KG/M2 | OXYGEN SATURATION: 98 % | HEIGHT: 65 IN | HEART RATE: 71 BPM | DIASTOLIC BLOOD PRESSURE: 82 MMHG | WEIGHT: 116 LBS | TEMPERATURE: 97.7 F | RESPIRATION RATE: 16 BRPM | SYSTOLIC BLOOD PRESSURE: 130 MMHG

## 2020-10-12 DIAGNOSIS — G24.3 CERVICAL DYSTONIA: ICD-10-CM

## 2020-10-12 PROCEDURE — 95874 GUIDE NERV DESTR NEEDLE EMG: CPT | Performed by: PSYCHIATRY & NEUROLOGY

## 2020-10-12 PROCEDURE — 64616 CHEMODENERV MUSC NECK DYSTON: CPT | Performed by: PSYCHIATRY & NEUROLOGY

## 2020-10-12 RX ORDER — METHYLPREDNISOLONE 4 MG/1
TABLET ORAL
COMMUNITY

## 2020-10-12 NOTE — PROGRESS NOTES
Chief Complaint   Patient presents with    Procedure     Botox EMG guided procedure, pain level before procedure is a 5 out of 10     Visit Vitals  /82 (BP 1 Location: Right arm, BP Patient Position: Sitting)   Pulse 71   Temp 97.7 °F (36.5 °C)   Resp 16   Ht 5' 4.5\" (1.638 m)   Wt 116 lb (52.6 kg)   SpO2 98%   BMI 19.60 kg/m²

## 2021-01-05 ENCOUNTER — TELEPHONE (OUTPATIENT)
Dept: NEUROLOGY | Age: 71
End: 2021-01-05

## 2021-01-05 NOTE — TELEPHONE ENCOUNTER
Botox still valid may set up delivery -     Will need in office no later than this Friday - to ship on Thursday. Botox 100 units for Cervical Dystonia     Approved by [facility]   Case [id number]  9/1/20 - 10/1/2021.      [facility]     fyi to [address]

## 2021-01-06 ENCOUNTER — DOCUMENTATION ONLY (OUTPATIENT)
Dept: NEUROLOGY | Age: 71
End: 2021-01-06

## 2021-01-06 NOTE — PROGRESS NOTES
botox came in the office: 1/7/2021  MFR: saurav  LOT: [id number]  Exp: 10/2023  Appointment:1/11/2020  Specialty pharmacy:  [facility]

## 2021-01-08 NOTE — PROGRESS NOTES
Neurology Note    Patient ID:  Heather Hardy  [id number]  78 y.o.  1950      Date of Consultati on:  January 11, 2021         Subjective: my neck still hurts now that the botox has worn off. History of Present Illness:   Heather Hardy is a 78 y.o. female who returns to the neurology clinic at [facility] for an evaluation. She received her first botulinum toxin injection on 10/12/2020. Please see my history of present illness, examination, and treatment base plan from that day. She has a history of cervical dystonia and worsening neck pain, decreased range of motion and headaches. Since her injection, the patient reports that the first 10 days she felt that her neck was like glass. If felt like there were icicles there. This then subsided and she began to gradually be aware that she was not having the neck pain and headaches. She had a much higher level quality of life and freedom of movement of her neck. This lasted for the remainder of the 3 months until approximately 5 days ago when the medicine wore off. She felt her neck was more swollen radiating up the left side into her head with decreased range of motion. She has continued to be followed for her inflammatory arthritis and her Remicade dose was just increased from 6-week dosing to 4-week dosing.       Past Medical History:   Diagnosis Date    Autoimmune disease ([address]     rheumatoid arthritis    Ill-defined condition     Spondylosis    Osteoarthritis     Skin cancer         Past Surgical History:   Procedure Laterality Date    HX APPENDECTOMY      HX GYN      right ovary and fallopian tube removed    HX KNEE ARTHROSCOPY Left     HX ORTHOPAEDIC      back surgery        Family History   Problem Relation Age of Onset    Cancer Father         Social History     Tobacco Use    Smoking status: Former Smoker    Smokeless tobacco: Never Used   Substance Use Topics    Alcohol use: Not on file     Comment: eunice glass of wine per night        Allergies   Allergen Reactions    Codeine Unknown (comments)     Stomach upset    Penicillin G Unknown (comments)     Reaction as a child (doesn't remember)    Sulfa (Sulfonamide Antibiotics) Unknown (comments)     Blisters/welts around lips/mouth    Tetracycline Unknown (comments)     Raised welts around body and mainly back        Prior to Admission medications    Medication Sig Start Date End Date Taking? Authorizing Provider   infliximab (REMICADE IV) Remicade   Yes Provider, Historical   ramipriL (ALTACE) 2.5 mg capsule TAKE 1 CAPSULE BY MOUTH EVERY DAY 9/16/20  Yes Provider, Historical   escitalopram oxalate (LEXAPRO) 5 mg tablet TAKE 1 AND 1/2 TABLETS BY MOUTH EVERY DAY AT BEDTIME 8/27/20  Yes Provider, Historical   onabotulinumtoxinA (BOTOX) 100 unit injection 100 Units by IntraMUSCular route every three (3) months. Cervical dystonia. G24.3.  emg guided injections to the muscles of her neck bilaterally  Indications: twisting neck spasms 10/1/20  Yes Kwame Wheat,    LORazepam (ATIVAN) 1 mg tablet Take 1-2 mg by mouth nightly. Yes Provider, Historical   acetaminophen (TYLENOL EXTRA STRENGTH) 500 mg tablet Take 500 mg by mouth every six (6) hours as needed for Pain. Yes Provider, Historical   oxyCODONE-acetaminophen (PERCOCET) 7.5-325 mg per tablet Take 1 Tab by mouth every eight (8) hours as needed for Pain. Yes Provider, Historical   fish oil-omega-3 fatty acids 340-1,000 mg capsule Take 1 Cap by mouth three (3) times daily. Yes Provider, Historical   carboxymethylcellulose sodium (RETAINE CMC) 0.5 % dpet Administer 1 Drop to both eyes as needed. Uses drops 6-7 times a day. Yes Provider, Historical   cyclobenzaprine (FLEXERIL) 10 mg tablet Take 10 mg by mouth two (2) times a day. Yes Provider, Historical   methotrexate (RHEUMATREX) 2.5 mg tablet Daily total = 18.5 mg every Friday.    Yes Other, MD Pola   folic acid (FOLVITE) 1 mg tablet Take 4 mg by mouth daily. Yes Other, MD Pola   methylPREDNISolone (Medrol, Aguila,) 4 mg tablet Take  by mouth. Provider, Historical   traMADol (ULTRAM) 50 mg tablet Take 1 Tab by mouth every six (6) hours as needed for Pain. Max Daily Amount: 200 mg. 4/11/17   July Mitchell NP   pantoprazole (PROTONIX) 40 mg tablet Take 1 Tab by mouth two (2) times a day. Indications: Gastric Ulcer 4/11/17   July Mitchell NP   fluorouracil (EFUDEX) 5 % chemo cream Apply  to affected area two (2) times a day. forehead    Provider, Historical   lifitegrast (XIIDRA) 5 % dpet Apply 1 Drop to eye two (2) times a day. Provider, Historical   codeine-butalbital-acetaminophen-caffeine (FIORICET WITH CODEINE) -49-30 mg per capsule Take 1-2 Caps by mouth every four (4) hours as needed for Headache. Provider, Historical   cetirizine (ZYRTEC) 10 mg tablet Take 10 mg by mouth daily as needed for Allergies. Provider, Historical   tofacitinib (XELJANZ) 5 mg tab Take 5 mg by mouth two (2) times a day. Provider, Historical       Review of Systems:    General, constitutional: neck pain and stiffness  Eyes, vision: negative  Ears, nose, throat: negative  Cardiovascular, heart: negative  Respiratory: negative  Gastrointestinal: negative  Genitourinary: negative  Musculoskeletal: negative  Skin and integumentary: negative  Psychiatric: negative  Endocrine: negative  Neurological: negative, except for HPI  Hematologic/lymphatic: negative  Allergy/immunology: negative    Objective:     Visit Vitals  /80   Pulse 78   Temp 98.3 °F (36.8 °C) (Temporal)   Resp 18   Ht 5' 4.5\" (1.638 m)   Wt 121 lb (54.9 kg)   SpO2 100%   BMI 20.45 kg/m²          Physical Exam:      General:  appears well nourished in no acute distress  Lungs: no labored breathing. Skin: intact    Neurological exam:    Awake, alert, oriented to person, place and time  Recent and remote memory were normal  Attention and concentration were intact  Language was intact.   There was no aphasia  Speech: no dysarthria  Fund of knowledge was preserved    Cranial nerves:   II-XII were tested    Visual fields were full  Eomi, no evidence of nystagmus  Facial motor: normal and symmetric  Hearing intact  SCM strength intact  Tongue: midline without fasciculations    Motor:   No pronator drift    left shoulder lift. She does have decreased range of motion in her neck in all spheres, but notable decreased right rotation and decrease head tilt to the left. She does have a right laterocollis and right rotation      No evidence of fasciculations    Strength testin out of 5 throughout    Gait: steady. Labs:     Lab Results   Component Value Date/Time    Sodium 139 2017 04:06 AM    Potassium 3.3 (L) 2017 04:06 AM    Chloride 107 2017 04:06 AM    Glucose 90 2017 04:06 AM    BUN 6 2017 04:06 AM    Creatinine 0.61 2017 04:06 AM    Calcium 7.8 (L) 2017 04:06 AM    WBC 5.3 2017 04:06 AM    HCT 30.6 (L) 2017 04:06 AM    HGB 10.1 (L) 2017 04:06 AM    PLATELET 397  04:06 AM       Imaging:    No results found for this or any previous visit. Results from [place] encounter on 17   CT ABD PELV W CONT    Narrative EXAM:  CT abdomen pelvis with contrast    INDICATION: Upper abdominal pain for 2 hours. COMPARISON: CT 2007. TECHNIQUE: Helical CT of the abdomen  and pelvis  following the uneventful  intravenous administration of nonionic contrast.  Coronal and sagittal reformats  are performed. CT dose reduction was achieved through use of a standardized  protocol tailored for this examination and automatic exposure control for dose  modulation. Adaptive statistical iterative reconstruction (ASIR) was utilized. FINDINGS:   The visualized lung bases demonstrate no mass or consolidation. There is a  stable benign 3 mm nodule in the left lower lobe.  The heart size is normal.  There is no pericardial or pleural effusion. The liver, spleen, pancreas, and adrenal glands are normal. The kidneys are  symmetric without hydronephrosis. The gall bladder is present  without intra- or  extra-hepatic biliary dilatation. There is distal gastric wall thickening with mild adjacent fluid and fat  stranding. There are multiple fluid-filled but nondilated small bowel loops. There are no dilated bowel loops. The appendix is not well seen. There are no  enlarged lymph nodes. There is no free air. The aorta is tortuous but tapers  without aneurysm. The urinary bladder is normal.  There is no pelvic mass. The bony structures are age-appropriate. Impression IMPRESSION:   Findings suggestive of gastroenteritis likely attributable to nonspecific  infection or inflammation. There is no bowel obstruction. Assessment and Plan:    The patient is a pleasant 73-year-old female with significant neck and shoulder pain with resultant headaches. Her examination does reveal preserved strength and intact reflexes. She does have decreased range of motion in her cervical spine and a cervical dystonia that is present. Cervical dystonia:    She has failed multiple oral medications and physical therapy. She continues to do stretching daily. Given the severity of symptoms and impact this has had to daily life as well as ineffectiveness of oral medications, botulinum toxin therapy would be indicated for her. Had a very positive response to the first dosing. I plan to injection   70 units   Units for the diagnosis of cervical dystonia    Consent performed and placed in medical records    Timeout was performed    All injections were performed under emg guidance unless otherwise indicated    Each vial of botulinum toxin was reconstituted with 2 cc of normal saline    Lot number:   [id number]  Expiration date:  10/2023    Procedure:botulinum toxin injections.     EMG guidance was utilized for all injections unless otherwise noted. Right side:    Muscle Number of units Number of sites Total units injected   Sternocleidomastoid      Spenius Capitus 20 1 20   Levator Scapulae      Scalene 15 1 15   Splenius Cervicus      Longissimus      Trapezius            Left side:     Muscle Number of units Number of sites Total units injected   Sternocleidomastoid      Spenius Capitus      Levator Scapulae 20 1 20   Scalene      Splenius Cervicus      Longissimus      Trapezius 15 1 15       Amount of botulinum toxin injected:70 units    Amount of wastage: 30 units    Total amount of botulinum toxin:  100 units    The patient tolerated the procedure and there was no immediate complications.     Blood loss minimal.      The patient was told to call the office or go to the nearest emergency room if side effects arise    The patient will return in 3 months for re-evaluation and consideration of future injections    Chronic headaches:  She will continue with Fiorinal.      Arthritis and multiple musculoskeletal complaints and degenerative spine disease :  Continue to follow with rheumatology       Patient Active Problem List   Diagnosis Code    PUD (peptic ulcer disease) K27.9    RA (rheumatoid arthritis) ([facility] M06.9                Signed By:  Saint Mulligan, DO FAAN    January 11, 2021

## 2021-01-11 ENCOUNTER — OFFICE VISIT (OUTPATIENT)
Dept: NEUROLOGY | Age: 71
End: 2021-01-11
Payer: MEDICARE

## 2021-01-11 VITALS
WEIGHT: 121 LBS | RESPIRATION RATE: 18 BRPM | OXYGEN SATURATION: 100 % | DIASTOLIC BLOOD PRESSURE: 80 MMHG | BODY MASS INDEX: 20.16 KG/M2 | HEART RATE: 78 BPM | SYSTOLIC BLOOD PRESSURE: 132 MMHG | HEIGHT: 65 IN | TEMPERATURE: 98.3 F

## 2021-01-11 DIAGNOSIS — G24.3 CERVICAL DYSTONIA: ICD-10-CM

## 2021-01-11 PROCEDURE — 95874 GUIDE NERV DESTR NEEDLE EMG: CPT | Performed by: PSYCHIATRY & NEUROLOGY

## 2021-01-11 PROCEDURE — 64616 CHEMODENERV MUSC NECK DYSTON: CPT | Performed by: PSYCHIATRY & NEUROLOGY

## 2021-03-29 ENCOUNTER — TELEPHONE (OUTPATIENT)
Dept: NEUROLOGY | Age: 71
End: 2021-03-29

## 2021-03-29 NOTE — TELEPHONE ENCOUNTER
Re: Botox     Botox 100 units for Cervical Dystonia     Approved by [facility]   Case [id number]  9/1/20 - 10/1/2021. [facility]    Michael Green message - they are contacing patient to obtain consent to ship to us. Her appt is 4/12/21 - requested delivery by 4/5.

## 2021-04-01 ENCOUNTER — TELEPHONE (OUTPATIENT)
Dept: NEUROLOGY | Age: 71
End: 2021-04-01

## 2021-04-05 ENCOUNTER — PATIENT MESSAGE (OUTPATIENT)
Dept: NEUROLOGY | Age: 71
End: 2021-04-05

## 2021-04-07 NOTE — TELEPHONE ENCOUNTER
Spoke with Sho from [facility]; delivery scheduled for 4/8.

## 2021-04-08 ENCOUNTER — TELEPHONE (OUTPATIENT)
Dept: NEUROLOGY | Age: 71
End: 2021-04-08

## 2021-04-08 NOTE — TELEPHONE ENCOUNTER
Botox arrived 4/8  100 units/vial  MANU: Allergen  SP: [facility]  LOT: [id number]  EXP: 07/23  APPT; 4/12

## 2021-04-11 NOTE — PROGRESS NOTES
Neurology Note    Patient ID:  Maciej Navarro  [id number]  78 y.o.  1950      Date of Consultati on:  April 12, 2021      Subjective: The Botox worked, not as well as the first time but I did not have any side effects like the first time       History of Present Illness:   Maciej Navarro is a 78 y.o. female who returns to the neurology clinic at [facility] for an evaluation. She received her first botulinum toxin injection on January 11,2021. Please see my history of present illness, examination, and treatment base plan from that day. She has a history of cervical dystonia and worsening neck pain, decreased range of motion and headaches. She did not notice any of the neck pain and stiffness immediately after the injection like is the first time. She noticed that she got approximately 30 to 40% improvement with the injection. She did notice a change in her neck symptoms and more of a holocephalic headache. She is also noticed a bit of worsening of a tremor in her hands that she wanted to discuss. She would like to continue to receive the botulinum toxin. She does continue to struggle with her inflammatory arthritis and is continuing with ongoing treatment.       Past Medical History:   Diagnosis Date    Autoimmune disease ([address]     rheumatoid arthritis    Ill-defined condition     Spondylosis    Osteoarthritis     Skin cancer         Past Surgical History:   Procedure Laterality Date    HX APPENDECTOMY      HX GYN      right ovary and fallopian tube removed    HX KNEE ARTHROSCOPY Left     HX ORTHOPAEDIC      back surgery        Family History   Problem Relation Age of Onset    Cancer Father         Social History     Tobacco Use    Smoking status: Former Smoker    Smokeless tobacco: Never Used   Substance Use Topics    Alcohol use: Not on file     Comment: a glass of wine per night        Allergies   Allergen Reactions    Codeine Unknown (comments) Stomach upset    Penicillin G Unknown (comments)     Reaction as a child (doesn't remember)    Sulfa (Sulfonamide Antibiotics) Unknown (comments)     Blisters/welts around lips/mouth    Tetracycline Unknown (comments)     Raised welts around body and mainly back        Prior to Admission medications    Medication Sig Start Date End Date Taking? Authorizing Provider   ramipriL (ALTACE) 2.5 mg capsule TAKE 1 CAPSULE BY MOUTH EVERY DAY 9/16/20  Yes Provider, Historical   onabotulinumtoxinA (BOTOX) 100 unit injection 100 Units by IntraMUSCular route every three (3) months. Cervical dystonia. G24.3.  emg guided injections to the muscles of her neck bilaterally  Indications: twisting neck spasms 10/1/20  Yes Kwame Wheat DO   pantoprazole (PROTONIX) 40 mg tablet Take 1 Tab by mouth two (2) times a day. Indications: Gastric Ulcer 4/11/17  Yes Dat Wesley, NP   LORazepam (ATIVAN) 1 mg tablet Take 1-2 mg by mouth nightly. Yes Provider, Historical   codeine-butalbital-acetaminophen-caffeine (FIORICET WITH CODEINE) -35-30 mg per capsule Take 1-2 Caps by mouth every four (4) hours as needed for Headache. Yes Provider, Historical   acetaminophen (TYLENOL EXTRA STRENGTH) 500 mg tablet Take 500 mg by mouth every six (6) hours as needed for Pain. Yes Provider, Historical   carboxymethylcellulose sodium (RETAINE CMC) 0.5 % dpet Administer 1 Drop to both eyes as needed. Uses drops 6-7 times a day. Yes Provider, Historical   cyclobenzaprine (FLEXERIL) 10 mg tablet Take 10 mg by mouth two (2) times a day. Yes Provider, Historical   methotrexate (RHEUMATREX) 2.5 mg tablet Daily total = 18.5 mg every Friday. Yes Other, MD Pola   folic acid (FOLVITE) 1 mg tablet Take 4 mg by mouth daily. Yes Other, MD Pola   methylPREDNISolone (Medrol, [place],) 4 mg tablet Take  by mouth.     Provider, Historical   infliximab (REMICADE IV) Remicade    Provider, Historical   escitalopram oxalate (LEXAPRO) 5 mg tablet TAKE 1 AND 1/2 TABLETS BY MOUTH EVERY DAY AT BEDTIME 8/27/20   Provider, Historical   fluorouracil (EFUDEX) 5 % chemo cream Apply  to affected area two (2) times a day. forehead    Provider, Historical   lifitegrast (XIIDRA) 5 % dpet Apply 1 Drop to eye two (2) times a day. Provider, Historical   oxyCODONE-acetaminophen (PERCOCET) 7.5-325 mg per tablet Take 1 Tab by mouth every eight (8) hours as needed for Pain. Provider, Historical   cetirizine (ZYRTEC) 10 mg tablet Take 10 mg by mouth daily as needed for Allergies. Provider, Historical   fish oil-omega-3 fatty acids 340-1,000 mg capsule Take 1 Cap by mouth three (3) times daily. Provider, Historical   tofacitinib (XELJANZ) 5 mg tab Take 5 mg by mouth two (2) times a day. Provider, Historical       Review of Systems:    General, constitutional: neck pain and stiffness  Eyes, vision: negative  Ears, nose, throat: negative  Cardiovascular, heart: negative  Respiratory: negative  Gastrointestinal: negative  Genitourinary: negative  Musculoskeletal: negative  Skin and integumentary: negative  Psychiatric: negative  Endocrine: negative  Neurological: negative, except for HPI  Hematologic/lymphatic: negative  Allergy/immunology: negative    Objective:     Visit Vitals  /68 (BP 1 Location: Left upper arm, BP Patient Position: Sitting, BP Cuff Size: Adult)   Pulse 68   Resp 16   SpO2 98%          Physical Exam:    General:  appears well nourished in no acute distress  Lungs: no labored breathing. Skin: intact    Neurological exam:    Awake, alert, oriented to person, place and time  Recent and remote memory were normal  Attention and concentration were intact  Language was intact.   There was no aphasia  Speech: no dysarthria  Fund of knowledge was preserved    Cranial nerves:   II-XII were tested    Visual fields were full  Eomi, no evidence of nystagmus  Facial motor: normal and symmetric  Hearing intact  SCM strength intact  Tongue: midline without fasciculations    Motor:   No pronator drift    left shoulder lift. She does have decreased range of motion in her neck in all spheres, but notable decreased right rotation and decrease head tilt to the left. She does have a right laterocollis and right rotation. She also does have quite taut cervical paraspinal musculatures      No evidence of fasciculations    Strength testin out of 5 throughout    There was no resting tremor she does have an action base essential tremor in her bilateral upper extremities which is mild    Gait: steady. Labs:     Lab Results   Component Value Date/Time    Sodium 139 2017 04:06 AM    Potassium 3.3 (L) 2017 04:06 AM    Chloride 107 2017 04:06 AM    Glucose 90 2017 04:06 AM    BUN 6 2017 04:06 AM    Creatinine 0.61 2017 04:06 AM    Calcium 7.8 (L) 2017 04:06 AM    WBC 5.3 2017 04:06 AM    HCT 30.6 (L) 2017 04:06 AM    HGB 10.1 (L) 2017 04:06 AM    PLATELET 963  04:06 AM       Imaging:    No results found for this or any previous visit. Results from [place] encounter on 17   CT ABD PELV W CONT    Narrative EXAM:  CT abdomen pelvis with contrast    INDICATION: Upper abdominal pain for 2 hours. COMPARISON: CT 2007. TECHNIQUE: Helical CT of the abdomen  and pelvis  following the uneventful  intravenous administration of nonionic contrast.  Coronal and sagittal reformats  are performed. CT dose reduction was achieved through use of a standardized  protocol tailored for this examination and automatic exposure control for dose  modulation. Adaptive statistical iterative reconstruction (ASIR) was utilized. FINDINGS:   The visualized lung bases demonstrate no mass or consolidation. There is a  stable benign 3 mm nodule in the left lower lobe. The heart size is normal.  There is no pericardial or pleural effusion.     The liver, spleen, pancreas, and adrenal glands are normal. The kidneys are  symmetric without hydronephrosis. The gall bladder is present  without intra- or  extra-hepatic biliary dilatation. There is distal gastric wall thickening with mild adjacent fluid and fat  stranding. There are multiple fluid-filled but nondilated small bowel loops. There are no dilated bowel loops. The appendix is not well seen. There are no  enlarged lymph nodes. There is no free air. The aorta is tortuous but tapers  without aneurysm. The urinary bladder is normal.  There is no pelvic mass. The bony structures are age-appropriate. Impression IMPRESSION:   Findings suggestive of gastroenteritis likely attributable to nonspecific  infection or inflammation. There is no bowel obstruction. Assessment and Plan:    The patient is a pleasant 68-year-old female with significant neck and shoulder pain with resultant headaches. Her examination does reveal preserved strength and intact reflexes. She does have decreased range of motion in her cervical spine and a cervical dystonia that is present. Cervical dystonia:    She has failed multiple oral medications and physical therapy. She continues to do stretching daily. Given the severity of symptoms and impact this has had to daily life as well as ineffectiveness of oral medications, botulinum toxin therapy would continue to be indicated for her. She does have continued response to the botulinum toxin      I plan to injection   80  units   Units for the diagnosis of cervical dystonia    Consent performed and placed in medical records    Timeout was performed    All injections were performed under emg guidance unless otherwise indicated    Each vial of botulinum toxin was reconstituted with 2 cc of normal saline    Lot number:  [id number]  Expiration date:  07/2023    Procedure:botulinum toxin injections. EMG guidance was utilized for all injections unless otherwise noted.      Right side:    Muscle Number of units Number of sites Total units injected   Sternocleidomastoid      Spenius Capitus 20 1 20   Levator Scapulae      Scalene 15 1 15   Splenius Cervicus      Longissimus      Trapezius            Left side:     Muscle Number of units Number of sites Total units injected   Sternocleidomastoid      Spenius Capitus      Levator Scapulae 20 1 20   Scalene      Splenius Cervicus      Longissimus 10 1 10   Trapezius 15 1 15       Amount of botulinum toxin injected:80 units    Amount of wastage: 20 units    Total amount of botulinum toxin:  100 units    The patient tolerated the procedure and there was no immediate complications. Blood loss minimal.      The patient was told to call the office or go to the nearest emergency room if side effects arise    The patient will return in 3 months for re-evaluation and consideration of future injections    Chronic headaches:  She will continue with Fiorinal.    Essential tremor:  I did discuss with her the differential including metabolic. She will follow up with her primary care doctor in regards to having her thyroid, parathyroid, and vitamin B12 level checked. Pending the results of that, if medication is necessary, a low dose of primidone or propranolol can be considered. I did discuss this with the patient and she will follow up with me after discussing with her primary care doctor. I did tell her there is no evidence of parkinsonism on today's examination.     Arthritis and multiple musculoskeletal complaints and degenerative spine disease :  Continue to follow with rheumatology       Patient Active Problem List   Diagnosis Code    PUD (peptic ulcer disease) K27.9    RA (rheumatoid arthritis) ([facility] M06.9                Signed By:  Veda Shone, DO FAAN    April 12, 2021

## 2021-04-12 ENCOUNTER — OFFICE VISIT (OUTPATIENT)
Dept: NEUROLOGY | Age: 71
End: 2021-04-12
Payer: MEDICARE

## 2021-04-12 VITALS
OXYGEN SATURATION: 98 % | RESPIRATION RATE: 16 BRPM | HEART RATE: 68 BPM | DIASTOLIC BLOOD PRESSURE: 68 MMHG | SYSTOLIC BLOOD PRESSURE: 110 MMHG

## 2021-04-12 DIAGNOSIS — G24.3 CERVICAL DYSTONIA: ICD-10-CM

## 2021-04-12 PROCEDURE — 64616 CHEMODENERV MUSC NECK DYSTON: CPT | Performed by: PSYCHIATRY & NEUROLOGY

## 2021-04-12 PROCEDURE — 95874 GUIDE NERV DESTR NEEDLE EMG: CPT | Performed by: PSYCHIATRY & NEUROLOGY

## 2021-04-12 NOTE — LETTER
4/12/2021    Patient: Erendira Alcaraz   YOB: 1950   Date of Visit: 4/12/2021     Barbara Griffin MD  [address]  [address] [address]  Via Fax: [phone]    Dear aBrbara Griffin MD,      Thank you for referring Ms. Yony Coleman to [address] [place] [address] for evaluation. My notes for this consultation are attached. If you have questions, please do not hesitate to call me. I look forward to following your patient along with you.       Sincerely,    Kwame Wheat, DO

## 2021-07-01 ENCOUNTER — TELEPHONE (OUTPATIENT)
Dept: NEUROLOGY | Age: 71
End: 2021-07-01

## 2021-07-01 NOTE — TELEPHONE ENCOUNTER
Botox approval still valid for July appt:     Emailed Michael montiel to set up delivery. Botox 100 units for Cervical Dystonia   Approved by [facility]   Case [id number]  9/1/20 - 10/1/2021.    [place]  [phone]

## 2021-07-02 ENCOUNTER — TELEPHONE (OUTPATIENT)
Dept: NEUROLOGY | Age: 71
End: 2021-07-02

## 2021-07-23 NOTE — PROGRESS NOTES
Neurology Note    Patient ID:  Faisal Pablo  [id number]  78 y.o.  1950      Date of Consultati on:  July 26, 2021      Subjective: I am here for botox       History of Present Illness:   Faisal Pablo is a 78 y.o. female who returns to the neurology clinic at [facility] for an evaluation. She received her last botox on 4/12/2021. Please see my history of present illness, examination, and treatment base plan from that day. She has a history of cervical dystonia and worsening neck pain, decreased range of motion and headaches. The patient does feel that the botulinum toxin has been working. She has noticed neck, shoulder, and head pain. There is more range of motion. She has noticed a slight change in her headaches as now it feels that there is a strap across her head which is constricting and squeezing. She does continue to struggle with her low back pain. She did have an MRI performed in [address] [place] [address] which shows significant disease at multiple levels. She has been seen by a neurosurgeon, Dr. Ilda Sanz. She is now off of her immunosuppressants for her inflammatory arthritis as her antibody titers for COVID-19 were low. She is going to stay off the medication and get a revaccination      She still does have her intermittent essential tremor. Through her metabolic work-up, she was found to have a high calcium and this is being further evaluated.       Past Medical History:   Diagnosis Date    Autoimmune disease ([address]     rheumatoid arthritis    Ill-defined condition     Spondylosis    Osteoarthritis     Skin cancer         Past Surgical History:   Procedure Laterality Date    HX APPENDECTOMY      HX GYN      right ovary and fallopian tube removed    HX KNEE ARTHROSCOPY Left     HX ORTHOPAEDIC      back surgery        Family History   Problem Relation Age of Onset    Cancer Father         Social History     Tobacco Use    Smoking status: Former Smoker    Smokeless tobacco: Never Used   Substance Use Topics    Alcohol use: Not on file     Comment: a glass of wine per night        Allergies   Allergen Reactions    Codeine Unknown (comments)     Stomach upset    Penicillin G Unknown (comments)     Reaction as a child (doesn't remember)    Sulfa (Sulfonamide Antibiotics) Unknown (comments)     Blisters/welts around lips/mouth    Tetracycline Unknown (comments)     Raised welts around body and mainly back        Prior to Admission medications    Medication Sig Start Date End Date Taking? Authorizing Provider   baclofen (LIORESAL) 10 mg tablet Take 10 mg by mouth as needed for Muscle Spasm(s). Yes Provider, Historical   methylPREDNISolone (Medrol, [place],) 4 mg tablet Take  by mouth. Provider, Historical   infliximab (REMICADE IV) Remicade    Provider, Historical   ramipriL (ALTACE) 2.5 mg capsule TAKE 1 CAPSULE BY MOUTH EVERY DAY 9/16/20   Provider, Historical   escitalopram oxalate (LEXAPRO) 5 mg tablet TAKE 1 AND 1/2 TABLETS BY MOUTH EVERY DAY AT BEDTIME 8/27/20   Provider, Historical   onabotulinumtoxinA (BOTOX) 100 unit injection 100 Units by IntraMUSCular route every three (3) months. Cervical dystonia. G24.3.  emg guided injections to the muscles of her neck bilaterally  Indications: twisting neck spasms 10/1/20   Kwame Wheat DO   pantoprazole (PROTONIX) 40 mg tablet Take 1 Tab by mouth two (2) times a day. Indications: Gastric Ulcer 4/11/17   Kyler Alonso, DEMETRICE   fluorouracil (EFUDEX) 5 % chemo cream Apply  to affected area two (2) times a day. forehead    Provider, Historical   lifitegrast (XIIDRA) 5 % dpet Apply 1 Drop to eye two (2) times a day. Provider, Historical   LORazepam (ATIVAN) 1 mg tablet Take 1-2 mg by mouth nightly. Provider, Historical   codeine-butalbital-acetaminophen-caffeine (FIORICET WITH CODEINE) -66-30 mg per capsule Take 1-2 Caps by mouth every four (4) hours as needed for Headache.     Provider, Historical acetaminophen (TYLENOL EXTRA STRENGTH) 500 mg tablet Take 500 mg by mouth every six (6) hours as needed for Pain. Provider, Historical   oxyCODONE-acetaminophen (PERCOCET) 7.5-325 mg per tablet Take 1 Tab by mouth every eight (8) hours as needed for Pain. Provider, Historical   cetirizine (ZYRTEC) 10 mg tablet Take 10 mg by mouth daily as needed for Allergies. Provider, Historical   fish oil-omega-3 fatty acids 340-1,000 mg capsule Take 1 Cap by mouth three (3) times daily. Provider, Historical   carboxymethylcellulose sodium (RETAINE CMC) 0.5 % dpet Administer 1 Drop to both eyes as needed. Uses drops 6-7 times a day. Provider, Historical   tofacitinib (XELJANZ) 5 mg tab Take 5 mg by mouth two (2) times a day. Provider, Historical   cyclobenzaprine (FLEXERIL) 10 mg tablet Take 10 mg by mouth two (2) times a day. Patient not taking: Reported on 7/26/2021    Provider, Historical   methotrexate (RHEUMATREX) 2.5 mg tablet Daily total = 18.5 mg every Friday. Leora, MD Pola   folic acid (FOLVITE) 1 mg tablet Take 4 mg by mouth daily. Pola Corey MD       Review of Systems:    General, constitutional: neck pain and stiffness  Eyes, vision: negative  Ears, nose, throat: negative  Cardiovascular, heart: negative  Respiratory: negative  Gastrointestinal: negative  Genitourinary: negative  Musculoskeletal: negative  Skin and integumentary: negative  Psychiatric: negative  Endocrine: negative  Neurological: negative, except for HPI  Hematologic/lymphatic: negative  Allergy/immunology: negative    Objective: There were no vitals taken for this visit. Physical Exam:    General:  appears well nourished in no acute distress  Lungs: no labored breathing. Skin: intact    Neurological exam:    Awake, alert, oriented to person, place and time  Recent and remote memory were normal  Attention and concentration were intact  Language was intact.   There was no aphasia  Speech: no dysarthria  Fund of knowledge was preserved    Cranial nerves:   II-XII were tested    Visual fields were full  Eomi, no evidence of nystagmus  Facial motor: normal and symmetric  Hearing intact  SCM strength intact  Tongue: midline without fasciculations    Motor:   No pronator drift    left shoulder lift. She does have decreased range of motion in her neck in all spheres, but notable decreased right rotation and decrease head tilt to the left. She does have a right laterocollis and right rotation. She also does have quite taut cervical paraspinal musculatures    No evidence of fasciculations    Strength testin out of 5 throughout    There was no resting tremor she does have an action base essential tremor in her bilateral upper extremities which is mild. unchanged    Gait: steady. Labs:     Lab Results   Component Value Date/Time    Sodium 139 2017 04:06 AM    Potassium 3.3 (L) 2017 04:06 AM    Chloride 107 2017 04:06 AM    Glucose 90 2017 04:06 AM    BUN 6 2017 04:06 AM    Creatinine 0.61 2017 04:06 AM    Calcium 7.8 (L) 2017 04:06 AM    WBC 5.3 2017 04:06 AM    HCT 30.6 (L) 2017 04:06 AM    HGB 10.1 (L) 2017 04:06 AM    PLATELET 827 2541 04:06 AM       Imaging:    No results found for this or any previous visit. Results from [place] encounter on 17    CT ABD PELV W CONT    Narrative  EXAM:  CT abdomen pelvis with contrast    INDICATION: Upper abdominal pain for 2 hours. COMPARISON: CT 2007. TECHNIQUE: Helical CT of the abdomen  and pelvis  following the uneventful  intravenous administration of nonionic contrast.  Coronal and sagittal reformats  are performed. CT dose reduction was achieved through use of a standardized  protocol tailored for this examination and automatic exposure control for dose  modulation. Adaptive statistical iterative reconstruction (ASIR) was utilized.     FINDINGS:  The visualized lung bases demonstrate no mass or consolidation. There is a  stable benign 3 mm nodule in the left lower lobe. The heart size is normal.  There is no pericardial or pleural effusion. The liver, spleen, pancreas, and adrenal glands are normal. The kidneys are  symmetric without hydronephrosis. The gall bladder is present  without intra- or  extra-hepatic biliary dilatation. There is distal gastric wall thickening with mild adjacent fluid and fat  stranding. There are multiple fluid-filled but nondilated small bowel loops. There are no dilated bowel loops. The appendix is not well seen. There are no  enlarged lymph nodes. There is no free air. The aorta is tortuous but tapers  without aneurysm. The urinary bladder is normal.  There is no pelvic mass. The bony structures are age-appropriate. Impression  IMPRESSION:  Findings suggestive of gastroenteritis likely attributable to nonspecific  infection or inflammation. There is no bowel obstruction. Assessment and Plan:    The patient is a pleasant 81-year-old female with significant neck and shoulder pain with resultant headaches. Her examination does reveal preserved strength and intact reflexes. She does have decreased range of motion in her cervical spine and a cervical dystonia that is present. Cervical dystonia:    She has failed multiple oral medications and physical therapy. She continues to do stretching daily. Given the severity of symptoms and impact this has had to daily life as well as ineffectiveness of oral medications, botulinum toxin therapy would continue to be indicated for her.   She does have continued positive response to the botulinum toxin      I plan to injection   80  units   Units for the diagnosis of cervical dystonia    Consent performed and placed in medical records    Timeout was performed    All injections were performed under emg guidance unless otherwise indicated    Each vial of botulinum toxin was reconstituted with 2 cc of normal saline    Lot number:  [id number]  Expiration date:  06/2023    Procedure:botulinum toxin injections. EMG guidance was utilized for all injections unless otherwise noted. Right side:    Muscle Number of units Number of sites Total units injected   Sternocleidomastoid      Spenius Capitus 20 1 20   Levator Scapulae      Scalene 15 1 15   Splenius Cervicus      Longissimus      Trapezius            Left side:     Muscle Number of units Number of sites Total units injected   Sternocleidomastoid      Spenius Capitus      Levator Scapulae 20 1 20   Scalene      Splenius Cervicus      Longissimus 10 1 10   Trapezius 15 1 15       Amount of botulinum toxin injected:80 units    Amount of wastage: 20 units    Total amount of botulinum toxin:  100 units    The patient tolerated the procedure and there was no immediate complications. Blood loss minimal.      The patient was told to call the office or go to the nearest emergency room if side effects arise    The patient will return in 3 months for re-evaluation and consideration of future injections    Chronic headaches:  She will continue with Fiorinal.    Essential tremor:  she is continuing to undergo a metabolic work-up for high calcium. At this time, she will continue to have symptomatic management that she is not interested in a medication. Pending the results of that, if medication is necessary, a low dose of primidone or propranolol can be considered. I did discuss this with the patient and she will follow up with me after discussing with her primary care doctor. I did tell her there is no evidence of parkinsonism on today's examination.     Arthritis and multiple musculoskeletal complaints and degenerative spine disease :  Continue to follow with rheumatology       Patient Active Problem List   Diagnosis Code    PUD (peptic ulcer disease) K27.9    RA (rheumatoid arthritis) ([facility] M06.9                Signed By:  Anthony Coates DO FAAN July 26, 2021

## 2021-07-26 ENCOUNTER — OFFICE VISIT (OUTPATIENT)
Dept: NEUROLOGY | Age: 71
End: 2021-07-26
Payer: MEDICARE

## 2021-07-26 DIAGNOSIS — G24.3 CERVICAL DYSTONIA: ICD-10-CM

## 2021-07-26 PROCEDURE — 64616 CHEMODENERV MUSC NECK DYSTON: CPT | Performed by: PSYCHIATRY & NEUROLOGY

## 2021-07-26 PROCEDURE — 95874 GUIDE NERV DESTR NEEDLE EMG: CPT | Performed by: PSYCHIATRY & NEUROLOGY

## 2021-07-26 RX ORDER — BACLOFEN 10 MG/1
10 TABLET ORAL AS NEEDED
COMMUNITY

## 2021-07-26 NOTE — LETTER
7/26/2021    Patient: Manasa Chavis   YOB: 1950   Date of Visit: 7/26/2021     Brice Mejía MD  [place] [id number]  [address] [facility] [place] [address]  Via Fax: [phone]    Dear Brice eMjía MD,      Thank you for referring Ms. Keara Schofield to [address] [place] [address] for evaluation. My notes for this consultation are attached. If you have questions, please do not hesitate to call me. I look forward to following your patient along with you.       Sincerely,    Kwame Wheat, DO

## 2021-11-02 ENCOUNTER — TELEPHONE (OUTPATIENT)
Dept: NEUROLOGY | Age: 71
End: 2021-11-02

## 2021-11-02 NOTE — TELEPHONE ENCOUNTER
Botox     100 units renewal request     Approved - Approved  today  [id number];MOBNVD:[id number]; Review Type:Prior Auth; Coverage Start Date:10/03/2021; Coverage End Date:11/02/2022;     Spp is [employer]. CPT 17299 no PA required. Traditional Medicare.

## 2021-11-02 NOTE — TELEPHONE ENCOUNTER
Called [facility] to schedule delivery botox; New RX was needed; I was able to give verbal prescription to pharmacist. 11/2/21.

## 2021-11-03 ENCOUNTER — TELEPHONE (OUTPATIENT)
Dept: NEUROLOGY | Age: 71
End: 2021-11-03

## 2021-11-04 ENCOUNTER — TELEPHONE (OUTPATIENT)
Dept: NEUROLOGY | Age: 71
End: 2021-11-04

## 2021-12-22 ENCOUNTER — TELEPHONE (OUTPATIENT)
Dept: NEUROLOGY | Age: 71
End: 2021-12-22

## 2022-01-04 ENCOUNTER — PATIENT MESSAGE (OUTPATIENT)
Dept: NEUROLOGY | Age: 72
End: 2022-01-04

## 2022-01-28 NOTE — TELEPHONE ENCOUNTER
Botox arrived on 1/28/2022.  100 units/vial  Allergen  [facility]  Lot: [id number]  Exp: 02/2024  Appt: 2/21/2022

## 2022-02-18 NOTE — PROGRESS NOTES
Neurology Note    Patient ID:  Kaleigh Mims  [id number]  73 y.o.  1950      Date of Consultati on:  February 21, 2022      Subjective: I am here for botox       History of Present Illness:   Kaleigh Mims is a 73 y.o. female who returns to the neurology clinic at [facility] for an evaluation. She received her last botox on July 26, 2021. Please see my history of present illness, examination, and treatment base plan from that day. She has a history of cervical dystonia and worsening neck pain, decreased range of motion and headaches. The patient does feel that the botulinum toxin worked quite well. She is approximately 2-1/2 months late in getting the Botox. She is trying to hold off on getting it as long as possible but the pain in her neck, the stiffness, and decreased range of motion progressed to such a point that she needed to receive the medication again. She was also noticing a weblike tightness that was coming across her head and    She has been through multiple other medical conditions. She is scheduled to have parathyroid surgery later this week. She also did have a chest CT done which showed multiple nodules and further work-up and care is being pursued. From a rheumatologic standpoint, she did come off of immunosuppressant so she could receive her vaccine. After receiving the vaccine, when restarting her immunosuppressants it was not working as well and she needed to switch. She is getting ready to start Actemra after her parathyroid biopsy. She is very much interested in receiving  botulinum toxin today.       Past Medical History:   Diagnosis Date    Autoimmune disease ([address]     rheumatoid arthritis    Ill-defined condition     Spondylosis    Osteoarthritis     Skin cancer         Past Surgical History:   Procedure Laterality Date    HX APPENDECTOMY      HX GYN      right ovary and fallopian tube removed    HX KNEE ARTHROSCOPY Left     HX ORTHOPAEDIC      back surgery        Family History   Problem Relation Age of Onset    Cancer Father         Social History     Tobacco Use    Smoking status: Former Smoker    Smokeless tobacco: Never Used   Substance Use Topics    Alcohol use: Not on file     Comment: a glass of wine per night        Allergies   Allergen Reactions    Codeine Unknown (comments)     Stomach upset    Penicillin G Unknown (comments)     Reaction as a child (doesn't remember)    Sulfa (Sulfonamide Antibiotics) Unknown (comments)     Blisters/welts around lips/mouth    Tetracycline Unknown (comments)     Raised welts around body and mainly back        Prior to Admission medications    Medication Sig Start Date End Date Taking? Authorizing Provider   baclofen (LIORESAL) 10 mg tablet Take 10 mg by mouth as needed for Muscle Spasm(s). Provider, Historical   methylPREDNISolone (Medrol, [place],) 4 mg tablet Take  by mouth. Provider, Historical   infliximab (REMICADE IV) Remicade    Provider, Historical   ramipriL (ALTACE) 2.5 mg capsule TAKE 1 CAPSULE BY MOUTH EVERY DAY 9/16/20   Provider, Historical   escitalopram oxalate (LEXAPRO) 5 mg tablet TAKE 1 AND 1/2 TABLETS BY MOUTH EVERY DAY AT BEDTIME 8/27/20   Provider, Historical   onabotulinumtoxinA (BOTOX) 100 unit injection 100 Units by IntraMUSCular route every three (3) months. Cervical dystonia. G24.3.  emg guided injections to the muscles of her neck bilaterally  Indications: twisting neck spasms 10/1/20   Kwame Wheat DO   pantoprazole (PROTONIX) 40 mg tablet Take 1 Tab by mouth two (2) times a day. Indications: Gastric Ulcer 4/11/17   Ronald Pennington NP   fluorouracil (EFUDEX) 5 % chemo cream Apply  to affected area two (2) times a day. forehead    Provider, Historical   lifitegrast (XIIDRA) 5 % dpet Apply 1 Drop to eye two (2) times a day. Provider, Historical   LORazepam (ATIVAN) 1 mg tablet Take 1-2 mg by mouth nightly.     Provider, Historical codeine-butalbital-acetaminophen-caffeine (FIORICET WITH CODEINE) -55-30 mg per capsule Take 1-2 Caps by mouth every four (4) hours as needed for Headache. Provider, Historical   acetaminophen (TYLENOL EXTRA STRENGTH) 500 mg tablet Take 500 mg by mouth every six (6) hours as needed for Pain. Provider, Historical   oxyCODONE-acetaminophen (PERCOCET) 7.5-325 mg per tablet Take 1 Tab by mouth every eight (8) hours as needed for Pain. Provider, Historical   cetirizine (ZYRTEC) 10 mg tablet Take 10 mg by mouth daily as needed for Allergies. Provider, Historical   fish oil-omega-3 fatty acids 340-1,000 mg capsule Take 1 Cap by mouth three (3) times daily. Provider, Historical   carboxymethylcellulose sodium (RETAINE CMC) 0.5 % dpet Administer 1 Drop to both eyes as needed. Uses drops 6-7 times a day. Provider, Historical   tofacitinib (XELJANZ) 5 mg tab Take 5 mg by mouth two (2) times a day. Provider, Historical   cyclobenzaprine (FLEXERIL) 10 mg tablet Take 10 mg by mouth two (2) times a day. Patient not taking: Reported on 7/26/2021    Provider, Historical   methotrexate (RHEUMATREX) 2.5 mg tablet Daily total = 18.5 mg every Friday. Pola Corey MD   folic acid (FOLVITE) 1 mg tablet Take 4 mg by mouth daily.     Pola Corey MD       Review of Systems:    General, constitutional: neck pain and stiffness  Eyes, vision: negative  Ears, nose, throat: negative  Cardiovascular, heart: negative  Respiratory: negative  Gastrointestinal: negative  Genitourinary: negative  Musculoskeletal: negative  Skin and integumentary: negative  Psychiatric: negative  Endocrine: negative  Neurological: negative, except for HPI  Hematologic/lymphatic: negative  Allergy/immunology: negative    Objective:     Visit Vitals  /86 (BP 1 Location: Left upper arm, BP Patient Position: Sitting, BP Cuff Size: Adult)   Pulse 70   Temp 97.7 °F (36.5 °C) (Temporal)   Resp 18   Ht 5' 4.5\" (1.638 m)   Wt 119 lb 9.6 oz (54.3 kg)   SpO2 100%   BMI 20.21 kg/m²          Physical Exam:    General:  appears well nourished in no acute distress  Lungs: no labored breathing. Skin: intact    Neurological exam:    Awake, alert, oriented to person, place and time  Recent and remote memory were normal  Attention and concentration were intact  Language was intact. There was no aphasia  Speech: no dysarthria  Fund of knowledge was preserved    Cranial nerves:   II-XII were tested    Visual fields were full  Eomi, no evidence of nystagmus  Facial motor: normal and symmetric  Hearing intact  SCM strength intact  Tongue: midline without fasciculations    Motor:   No pronator drift    left shoulder lift. She does have decreased range of motion in her neck in all spheres, but notable decreased right rotation and decrease head tilt to the left. She does have a right laterocollis and right rotation. She also does have quite taut cervical paraspinal musculatures. Very similar to her last visit. No evidence of fasciculations    Strength testin out of 5 throughout    There was no resting tremor. she does have an action base essential tremor in her bilateral upper extremities which is mild. unchanged    Gait: steady. Labs:     Lab Results   Component Value Date/Time    Sodium 139 2017 04:06 AM    Potassium 3.3 (L) 2017 04:06 AM    Chloride 107 2017 04:06 AM    Glucose 90 2017 04:06 AM    BUN 6 2017 04:06 AM    Creatinine 0.61 2017 04:06 AM    Calcium 7.8 (L) 2017 04:06 AM    WBC 5.3 2017 04:06 AM    HCT 30.6 (L) 2017 04:06 AM    HGB 10.1 (L) 2017 04:06 AM    PLATELET 064  04:06 AM       Imaging:    No results found for this or any previous visit. Results from [place] encounter on 17    CT ABD PELV W CONT    Narrative  EXAM:  CT abdomen pelvis with contrast    INDICATION: Upper abdominal pain for 2 hours. COMPARISON: CT 2007.     TECHNIQUE: Helical CT of the abdomen  and pelvis  following the uneventful  intravenous administration of nonionic contrast.  Coronal and sagittal reformats  are performed. CT dose reduction was achieved through use of a standardized  protocol tailored for this examination and automatic exposure control for dose  modulation. Adaptive statistical iterative reconstruction (ASIR) was utilized. FINDINGS:  The visualized lung bases demonstrate no mass or consolidation. There is a  stable benign 3 mm nodule in the left lower lobe. The heart size is normal.  There is no pericardial or pleural effusion. The liver, spleen, pancreas, and adrenal glands are normal. The kidneys are  symmetric without hydronephrosis. The gall bladder is present  without intra- or  extra-hepatic biliary dilatation. There is distal gastric wall thickening with mild adjacent fluid and fat  stranding. There are multiple fluid-filled but nondilated small bowel loops. There are no dilated bowel loops. The appendix is not well seen. There are no  enlarged lymph nodes. There is no free air. The aorta is tortuous but tapers  without aneurysm. The urinary bladder is normal.  There is no pelvic mass. The bony structures are age-appropriate. Impression  IMPRESSION:  Findings suggestive of gastroenteritis likely attributable to nonspecific  infection or inflammation. There is no bowel obstruction. Assessment and Plan:    The patient is a pleasant 68-year-old female with significant neck and shoulder pain with resultant headaches. Her examination does reveal preserved strength and intact reflexes. She does have decreased range of motion in her cervical spine and a cervical dystonia that is present. Cervical dystonia:    She has failed multiple oral medications and physical therapy. She continues to do stretching daily.   Given the severity of symptoms and impact this has had to daily life as well as ineffectiveness of oral medications, botulinum toxin therapy would continue to be indicated for her. She does have continued positive response to the botulinum toxin      I plan to injection   80  units   Units for the diagnosis of cervical dystonia    Consent performed and placed in medical records    Timeout was performed    All injections were performed under emg guidance unless otherwise indicated    Each vial of botulinum toxin was reconstituted with 2 cc of normal saline    Lot number:  [id number]  Expiration date:  02/2024    Procedure:botulinum toxin injections. EMG guidance was utilized for all injections unless otherwise noted. Right side:     Muscle Number of units Number of sites Total units injected   Sternocleidomastoid         Spenius Capitus 20 1 20   Levator Scapulae         Scalene 15 1 15   Splenius Cervicus         Longissimus         Trapezius            Left side:      Muscle Number of units Number of sites Total units injected   Sternocleidomastoid         Spenius Capitus         Levator Scapulae 20 1 20   Scalene         Splenius Cervicus         Longissimus 10 1 10   Trapezius 15 1 15         Amount of botulinum toxin injected:80 units     Amount of wastage: 20 units     Total amount of botulinum toxin:  100 units     The patient tolerated the procedure and there was no immediate complications.     Blood loss minimal.        The patient was told to call the office or go to the nearest emergency room if side effects arise     The patient will return in 3 months for re-evaluation and consideration of future injections     Chronic headaches:  She will continue with Fiorinal.     Essential tremor:  she is continuing to undergo a metabolic work-up for high calcium. At this time, she will continue to have symptomatic management that she is not interested in a medication. Pending the results of that, if medication is necessary, a low dose of primidone or propranolol can be considered.   I did discuss this with the patient and she will follow up with me after discussing with her primary care doctor.   I did tell her there is no evidence of parkinsonism on today's examination.     Arthritis and multiple musculoskeletal complaints and degenerative spine disease :  Continue to follow with rheumatology           Patient Active Problem List   Diagnosis Code    PUD (peptic ulcer disease) K27.9    RA (rheumatoid arthritis) ([facility] M06.9                Signed By:  Alayna Martínez DO FAAN    February 21, 2022

## 2022-02-21 ENCOUNTER — OFFICE VISIT (OUTPATIENT)
Dept: NEUROLOGY | Age: 72
End: 2022-02-21
Payer: MEDICARE

## 2022-02-21 VITALS
BODY MASS INDEX: 19.93 KG/M2 | TEMPERATURE: 97.7 F | WEIGHT: 119.6 LBS | SYSTOLIC BLOOD PRESSURE: 138 MMHG | DIASTOLIC BLOOD PRESSURE: 86 MMHG | HEIGHT: 65 IN | OXYGEN SATURATION: 100 % | HEART RATE: 70 BPM | RESPIRATION RATE: 18 BRPM

## 2022-02-21 DIAGNOSIS — G24.3 CERVICAL DYSTONIA: ICD-10-CM

## 2022-02-21 PROCEDURE — 95874 GUIDE NERV DESTR NEEDLE EMG: CPT | Performed by: PSYCHIATRY & NEUROLOGY

## 2022-02-21 PROCEDURE — 64616 CHEMODENERV MUSC NECK DYSTON: CPT | Performed by: PSYCHIATRY & NEUROLOGY

## 2022-02-21 NOTE — PROGRESS NOTES
Rm    No chief complaint on file. There were no vitals taken for this visit. 1. Have you been to the ER, urgent care clinic since your last visit? Hospitalized since your last visit? No    2. Have you seen or consulted any other health care providers outside of the [address] [place] [address] since your last visit? Include any pap smears or colon screening. No     Health Maintenance Due   Topic Date Due    Hepatitis C Screening  Never done    Depression Screen  Never done    DTaP/Tdap/Td series (1 - Tdap) Never done    Lipid Screen  Never done    Colorectal Cancer Screening Combo  Never done    Shingrix Vaccine Age 50> (1 of 2) Never done    Breast Cancer Screen Mammogram  Never done    Bone Densitometry (Dexa) Screening  Never done    Pneumococcal 65+ years (1 of 1 - PPSV23) 12/06/2017    Medicare Yearly Exam  Never done    COVID-19 Vaccine (3 - Booster for Charlcie Arn series) 08/16/2021        3 most recent PHQ Screens 2/21/2022   Little interest or pleasure in doing things Not at all   Feeling down, depressed, irritable, or hopeless Not at all   Total Score PHQ 2 0        Fall Risk Assessment, last 12 mths 2/21/2022   Able to walk? Yes   Fall in past 12 months? 1   Do you feel unsteady? 0   Are you worried about falling 0   Is TUG test greater than 12 seconds? (No Data)   Is the gait abnormal? 0   Number of falls in past 12 months 2   Fall with injury? 1       No flowsheet data found.

## 2022-02-21 NOTE — LETTER
2/21/2022    Patient: Juan C Calle   YOB: 1950   Date of Visit: 2/21/2022     Amado Gonzales MD  [address] [place] [id number]  Via Fax: [phone]    Dear Amado Gonzales MD,      Thank you for referring Ms. Afshan Azevedo to [address] [place] [address] for evaluation. My notes for this consultation are attached. If you have questions, please do not hesitate to call me. I look forward to following your patient along with you.       Sincerely,    Kwame Wheat, DO

## 2022-03-19 PROBLEM — K27.9 PUD (PEPTIC ULCER DISEASE): Status: ACTIVE | Noted: 2017-04-11

## 2022-03-20 PROBLEM — M06.9 RA (RHEUMATOID ARTHRITIS) (HCC): Status: ACTIVE | Noted: 2017-04-11

## 2022-03-25 ENCOUNTER — TELEPHONE (OUTPATIENT)
Dept: NEUROLOGY | Age: 72
End: 2022-03-25

## 2022-05-05 ENCOUNTER — DOCUMENTATION ONLY (OUTPATIENT)
Dept: NEUROLOGY | Age: 72
End: 2022-05-05

## 2022-05-05 NOTE — PROGRESS NOTES
Received from [facility] botox for patient appointment on 6/17/2022  [facility]: Lakesha Burton  Lot: [id number]  Expiration date: 05/31/2024  [facility] [id number]

## 2022-06-17 ENCOUNTER — OFFICE VISIT (OUTPATIENT)
Dept: NEUROLOGY | Age: 72
End: 2022-06-17
Payer: MEDICARE

## 2022-06-17 VITALS
RESPIRATION RATE: 18 BRPM | WEIGHT: 119 LBS | SYSTOLIC BLOOD PRESSURE: 138 MMHG | DIASTOLIC BLOOD PRESSURE: 86 MMHG | BODY MASS INDEX: 19.83 KG/M2 | HEART RATE: 70 BPM | OXYGEN SATURATION: 98 % | HEIGHT: 65 IN

## 2022-06-17 DIAGNOSIS — G25.0 ESSENTIAL TREMOR: ICD-10-CM

## 2022-06-17 DIAGNOSIS — G24.3 CERVICAL DYSTONIA: Primary | ICD-10-CM

## 2022-06-17 PROCEDURE — 64616 CHEMODENERV MUSC NECK DYSTON: CPT | Performed by: PSYCHIATRY & NEUROLOGY

## 2022-06-17 PROCEDURE — 95874 GUIDE NERV DESTR NEEDLE EMG: CPT | Performed by: PSYCHIATRY & NEUROLOGY

## 2022-06-17 NOTE — LETTER
6/17/2022    Patient: Ellie Vivar   YOB: 1950   Date of Visit: 6/17/2022     Monica Bowser MD  [address] [place] [phone]  Via Fax: [phone]    Dear Monica Bowser MD,      Thank you for referring Ms. Deb Prakash to [address] [place] [address] for evaluation. My notes for this consultation are attached. If you have questions, please do not hesitate to call me. I look forward to following your patient along with you.       Sincerely,    Kwame Wheat, DO

## 2022-06-17 NOTE — PROGRESS NOTES
Neurology Note    Patient ID:  Soo Sharif  [id number]  73 y.o.  1950      Date of Consultati on:  June 17, 2022      Subjective: I am here for botox. My tremor is bothering me more       History of Present Illness:   Soo Sharif is a 73 y.o. female who returns to the neurology clinic at [facility] for an evaluation. She received her last botox on February 21, 2022. [facility] Please see my history of present illness, examination, and treatment base plan from that day. She has a history of cervical dystonia and worsening neck pain, decreased range of motion and headaches. Patient was quite pleased with how well her last injection worked. It worked for 3 months. There has been a delay in receiving her most recent injection due to other ongoing medical conditions. The last couple weeks has caused decreased range of motion in her head and neck with worsening stiffness and pain. She is very interested in getting the injections today. In regards to her medical health,She was in the hospital for 10 days. She ended up having 2 parathyroid surgeries. Found to have a nodule on her lung, which was ultimately benign. She did have a punctured lung and required admission needing a chest tube. Admitted to [facility]. Respiratory function has improved since that time. She has been restarted on her rheumatologic medication and her infusion is being doubled for her next infusion next week. She has been more tired and fatigued. He is also concerned about an increasing tremor. It is in both of her hands. It is slightly worse on the left than the right. There is no resting component to it is only with intention and action. It is very fine. At times she may feel the hand jerk. Currently she is not having any tremor. She is uncertain as to what may make it worse. She is also been struggling with her blood pressure recently and has just been started on a new blood pressure medicine.     Past Medical History:   Diagnosis Date    Autoimmune disease ([facility] [address]     rheumatoid arthritis    Ill-defined condition     Spondylosis    Osteoarthritis     Skin cancer         Past Surgical History:   Procedure Laterality Date    HX APPENDECTOMY      HX GYN      right ovary and fallopian tube removed    HX KNEE ARTHROSCOPY Left     HX ORTHOPAEDIC      back surgery        Family History   Problem Relation Age of Onset    Cancer Father         Social History     Tobacco Use    Smoking status: Former Smoker    Smokeless tobacco: Never Used   Substance Use Topics    Alcohol use: Not on file     Comment: a glass of wine per night        Allergies   Allergen Reactions    Codeine Unknown (comments)     Stomach upset    Penicillin G Unknown (comments)     Reaction as a child (doesn't remember)    Sulfa (Sulfonamide Antibiotics) Unknown (comments)     Blisters/welts around lips/mouth    Tetracycline Unknown (comments)     Raised welts around body and mainly back        Prior to Admission medications    Medication Sig Start Date End Date Taking? Authorizing Provider   baclofen (LIORESAL) 10 mg tablet Take 10 mg by mouth as needed for Muscle Spasm(s). Yes Provider, Historical   methylPREDNISolone (Medrol, [place],) 4 mg tablet Take  by mouth. Yes Provider, Historical   infliximab (REMICADE IV) Remicade   Yes Provider, Historical   ramipriL (ALTACE) 2.5 mg capsule TAKE 1 CAPSULE BY MOUTH EVERY DAY 9/16/20  Yes Provider, Historical   escitalopram oxalate (LEXAPRO) 5 mg tablet TAKE 1 AND 1/2 TABLETS BY MOUTH EVERY DAY AT BEDTIME 8/27/20  Yes Provider, Historical   onabotulinumtoxinA (BOTOX) 100 unit injection 100 Units by IntraMUSCular route every three (3) months. Cervical dystonia. G24.3.  emg guided injections to the muscles of her neck bilaterally  Indications: twisting neck spasms 10/1/20  Yes Kwame Wheat DO   pantoprazole (PROTONIX) 40 mg tablet Take 1 Tab by mouth two (2) times a day.  Indications: Gastric Ulcer 4/11/17  Yes Tomy Buchanan NP   fluorouracil (EFUDEX) 5 % chemo cream Apply  to affected area two (2) times a day. forehead   Yes Provider, Historical   lifitegrast (XIIDRA) 5 % dpet Apply 1 Drop to eye two (2) times a day. Yes Provider, Historical   LORazepam (ATIVAN) 1 mg tablet Take 1-2 mg by mouth nightly. Yes Provider, Historical   codeine-butalbital-acetaminophen-caffeine (FIORICET WITH CODEINE) -58-30 mg per capsule Take 1-2 Caps by mouth every four (4) hours as needed for Headache. Yes Provider, Historical   acetaminophen (TYLENOL EXTRA STRENGTH) 500 mg tablet Take 500 mg by mouth every six (6) hours as needed for Pain. Yes Provider, Historical   oxyCODONE-acetaminophen (PERCOCET) 7.5-325 mg per tablet Take 1 Tab by mouth every eight (8) hours as needed for Pain. Yes Provider, Historical   cetirizine (ZYRTEC) 10 mg tablet Take 10 mg by mouth daily as needed for Allergies. Yes Provider, Historical   fish oil-omega-3 fatty acids 340-1,000 mg capsule Take 1 Cap by mouth three (3) times daily. Yes Provider, Historical   carboxymethylcellulose sodium (RETAINE CMC) 0.5 % dpet Administer 1 Drop to both eyes as needed. Uses drops 6-7 times a day. Yes Provider, Historical   tofacitinib (XELJANZ) 5 mg tab Take 5 mg by mouth two (2) times a day. Yes Provider, Historical   cyclobenzaprine (FLEXERIL) 10 mg tablet Take 10 mg by mouth two (2) times a day. Yes Provider, Historical   methotrexate (RHEUMATREX) 2.5 mg tablet Daily total = 18.5 mg every Friday. Yes Leora, MD Pola   folic acid (FOLVITE) 1 mg tablet Take 4 mg by mouth daily.    Yes Other, MD Pola       Review of Systems:    General, constitutional: neck pain and stiffness  Eyes, vision: negative  Ears, nose, throat: negative  Cardiovascular, heart: negative  Respiratory: negative  Gastrointestinal: negative  Genitourinary: negative  Musculoskeletal: negative  Skin and integumentary: negative  Psychiatric: negative  Endocrine: negative  Neurological: negative, except for HPI  Hematologic/lymphatic: negative  Allergy/immunology: negative    Objective:     Visit Vitals  /86 (BP 1 Location: Left arm, BP Patient Position: Sitting, BP Cuff Size: Adult)   Pulse 70   Resp 18   Ht 5' 4.5\" (1.638 m)   Wt 119 lb (54 kg)   SpO2 98%   BMI 20.11 kg/m²          Physical Exam:    General:  appears well nourished in no acute distress  Lungs: no labored breathing. Skin: intact    Neurological exam:    Awake, alert, oriented to person, place and time  Recent and remote memory were normal  Attention and concentration were intact  Language was intact. There was no aphasia  Speech: no dysarthria  Fund of knowledge was preserved    Cranial nerves:   II-XII were tested    Visual fields were full  Eomi, no evidence of nystagmus  Facial motor: normal and symmetric  Hearing intact  SCM strength intact  Tongue: midline without fasciculations    Motor:   No pronator drift    left shoulder lift. She does have decreased range of motion in her neck in all spheres, but notable decreased right rotation and decrease head tilt to the left. She does have a right laterocollis and right rotation. She also does have quite taut cervical paraspinal musculatures. Very similar to her last visit. No evidence of fasciculations    Strength testin out of 5 throughout    There was no resting tremor. she does have an action base essential tremor in her bilateral upper extremities which is mild. Unchanged. It was not apparent when she was writing or drawing today. There is no cogwheel rigidity    Gait: steady.      Labs:     Lab Results   Component Value Date/Time    Sodium 139 2017 04:06 AM    Potassium 3.3 (L) 2017 04:06 AM    Chloride 107 2017 04:06 AM    Glucose 90 2017 04:06 AM    BUN 6 2017 04:06 AM    Creatinine 0.61 2017 04:06 AM    Calcium 7.8 (L) 2017 04:06 AM    WBC 5.3 2017 04:06 AM    HCT 30.6 (L) 2017 04:06 AM    HGB 10.1 (L) 04/11/2017 04:06 AM    PLATELET 014 45/76/8978 04:06 AM       Imaging:    No results found for this or any previous visit. Results from [place] encounter on 04/09/17    CT ABD PELV W CONT    Narrative  EXAM:  CT abdomen pelvis with contrast    INDICATION: Upper abdominal pain for 2 hours. COMPARISON: CT 8/1/2007. TECHNIQUE: Helical CT of the abdomen  and pelvis  following the uneventful  intravenous administration of nonionic contrast.  Coronal and sagittal reformats  are performed. CT dose reduction was achieved through use of a standardized  protocol tailored for this examination and automatic exposure control for dose  modulation. Adaptive statistical iterative reconstruction (ASIR) was utilized. FINDINGS:  The visualized lung bases demonstrate no mass or consolidation. There is a  stable benign 3 mm nodule in the left lower lobe. The heart size is normal.  There is no pericardial or pleural effusion. The liver, spleen, pancreas, and adrenal glands are normal. The kidneys are  symmetric without hydronephrosis. The gall bladder is present  without intra- or  extra-hepatic biliary dilatation. There is distal gastric wall thickening with mild adjacent fluid and fat  stranding. There are multiple fluid-filled but nondilated small bowel loops. There are no dilated bowel loops. The appendix is not well seen. There are no  enlarged lymph nodes. There is no free air. The aorta is tortuous but tapers  without aneurysm. The urinary bladder is normal.  There is no pelvic mass. The bony structures are age-appropriate. Impression  IMPRESSION:  Findings suggestive of gastroenteritis likely attributable to nonspecific  infection or inflammation. There is no bowel obstruction. Assessment and Plan:    The patient is a pleasant 33-year-old female with significant neck and shoulder pain with resultant headaches.   Her examination does reveal preserved strength and intact reflexes. She does have decreased range of motion in her cervical spine and a cervical dystonia that is present. Cervical dystonia:    She has failed multiple oral medications and physical therapy. She continues to do stretching daily. Given the severity of symptoms and impact this has had to daily life as well as ineffectiveness of oral medications, botulinum toxin therapy would continue to be indicated for her. She does have continued positive response to the botulinum toxin      I plan to injection   80  units   Units for the diagnosis of cervical dystonia    Consent performed and placed in medical records    Timeout was performed    All injections were performed under emg guidance unless otherwise indicated    Each vial of botulinum toxin was reconstituted with 2 cc of normal saline    Lot number:  [id number]  Expiration date:  05/2024    Procedure:botulinum toxin injections. EMG guidance was utilized for all injections unless otherwise noted.    Right side:     Muscle Number of units Number of sites Total units injected   Sternocleidomastoid         Spenius Capitus 20 1 20   Levator Scapulae         Scalene 15 1 15   Splenius Cervicus         Longissimus         Trapezius            Left side:      Muscle Number of units Number of sites Total units injected   Sternocleidomastoid         Spenius Capitus         Levator Scapulae 20 1 20   Scalene         Splenius Cervicus         Longissimus 10 1 10   Trapezius 15 1 15         Amount of botulinum toxin injected:80 units     Amount of wastage: 20 units     Total amount of botulinum toxin:  100 units     The patient tolerated the procedure and there was no immediate complications.     Blood loss minimal.        The patient was told to call the office or go to the nearest emergency room if side effects arise     The patient will return in 3 months for re-evaluation and consideration of future injections     Chronic headaches:  She will continue with Fiorinal.     Essential tremor:  I did discuss with her that there is no evidence of parkinsonism. I did discuss treatment options including propranolol. At this time she is not interested in a medication. She will follow-up with her primary care doctor.   If the trem does persist after she is fully recovered from her other ongoing medical conditions, she will will discuss considering changing blood pressure medicines to include a beta-blocker such as propranolol which may help with the tremor.     Arthritis and multiple musculoskeletal complaints and degenerative spine disease :  Continue to follow with rheumatology           Patient Active Problem List   Diagnosis Code    PUD (peptic ulcer disease) K27.9    RA (rheumatoid arthritis) ([facility] M06.9                Signed By:  Gino Abdullahi DO FAAN    June 17, 2022

## 2022-07-12 ENCOUNTER — TELEPHONE (OUTPATIENT)
Dept: NEUROLOGY | Age: 72
End: 2022-07-12

## 2022-07-12 NOTE — TELEPHONE ENCOUNTER
Botox arrived on 7/12/2022.  200 units/vial  Allergen  [facility]  Lot: [id number]  Exp: 06/2024  Appt: 9/30/2022

## 2022-09-14 ENCOUNTER — TELEPHONE (OUTPATIENT)
Dept: NEUROLOGY | Age: 72
End: 2022-09-14

## 2022-09-21 ENCOUNTER — TRANSCRIBE ORDER (OUTPATIENT)
Dept: GENERAL RADIOLOGY | Age: 72
End: 2022-09-21

## 2022-09-21 ENCOUNTER — HOSPITAL ENCOUNTER (OUTPATIENT)
Dept: GENERAL RADIOLOGY | Age: 72
Discharge: HOME | End: 2022-09-21
Attending: FAMILY MEDICINE
Payer: MEDICARE

## 2022-09-21 DIAGNOSIS — M54.6 PAIN IN THORACIC SPINE: Primary | ICD-10-CM

## 2022-09-21 DIAGNOSIS — M54.6 PAIN IN THORACIC SPINE: ICD-10-CM

## 2022-09-21 PROCEDURE — 72070 X-RAY EXAM THORAC SPINE 2VWS: CPT

## 2022-09-26 ENCOUNTER — TELEPHONE (OUTPATIENT)
Dept: NEUROLOGY | Age: 72
End: 2022-09-26

## 2022-09-26 NOTE — TELEPHONE ENCOUNTER
Patient called to discuss Dr Denzel Aden botox schedule for next month because she may need to r/s her botox appointment.   [phone]

## 2022-09-26 NOTE — TELEPHONE ENCOUNTER
Called and spoke with patient. Verified name/. Patient stated she will be out of town and needs to reschedule her botox appointment. Rescheduled patient to 10/21/2022 @ 3 pm. Patient verbalized understanding.

## 2022-09-30 ENCOUNTER — TELEPHONE (OUTPATIENT)
Dept: NEUROLOGY | Age: 72
End: 2022-09-30

## 2022-09-30 NOTE — TELEPHONE ENCOUNTER
Called and spoke with patient. Rescheduled patient appointment from 10/21/2022 to 10/19/2022 due to Dr. Brendan Blanco not being in the office that afternoon. Patient verbalized understanding and will call the office back with any changes.

## 2022-10-06 ENCOUNTER — TELEPHONE (OUTPATIENT)
Dept: NEUROLOGY | Age: 72
End: 2022-10-06

## 2022-10-06 NOTE — TELEPHONE ENCOUNTER
Called and spoke with Jane Chowdhury @ [address] [facility]. Patient consent is needed. Jane Chowdhury reached out and had to leave a voicemail. I also reached out while on the phone with Jane Chowdhury and left a voicemail as well. Stated to contact [facility] to give consent for medication. Left [facility]'s number on voicemail.

## 2022-10-11 ENCOUNTER — TELEPHONE (OUTPATIENT)
Dept: NEUROLOGY | Age: 72
End: 2022-10-11

## 2022-10-11 NOTE — TELEPHONE ENCOUNTER
Botox arrived on 10/11/2022.  100 units/vial  Allergen  [facility]  Lot: [id number]  Exp: 04/2024  Appt: 10/19/2022  [phone]

## 2022-10-18 NOTE — PROGRESS NOTES
Neurology Note    Patient ID:  Kaleigh Mims  [id number]  73 y.o.  1950      Date of Consultati on:  October 18, 2022      Subjective: I am here for botox. My tremor is bothering me more       History of Present Illness:   Kaleigh Mims is a 73 y.o. female who returns to the neurology clinic at [facility] for an evaluation. She received her last botox on June 17, 2022. Please see my history of present illness, examination, and treatment base plan from that day. She has a history of cervical dystonia and worsening neck pain, decreased range of motion and headaches. Since that time, she did fracture her spine while traveling. Two fractures - one in thoracic and one in lumbar spine    She is now 4 months from her last injection. She did get very good relief for approximately 3 months in the last 3 to 4 weeks have been more severe. She has noticed increasing neck pain and stiffness with radiation up the posterior portion of her head. She denies any new medical conditions other than as noted above. She still does have decreased range of motion in her head and neck with increasing stiffness. She is very much interested in continuing the injections.       Of note, she did have a nosebleed this morning    Past Medical History:   Diagnosis Date    Autoimmune disease ([address]     rheumatoid arthritis    Ill-defined condition     Spondylosis    Osteoarthritis     Skin cancer         Past Surgical History:   Procedure Laterality Date    HX APPENDECTOMY      HX GYN      right ovary and fallopian tube removed    HX KNEE ARTHROSCOPY Left     HX ORTHOPAEDIC      back surgery        Family History   Problem Relation Age of Onset    Cancer Father         Social History     Tobacco Use    Smoking status: Former    Smokeless tobacco: Never   Substance Use Topics    Alcohol use: Not on file     Comment: a glass of wine per night        Allergies   Allergen Reactions    Codeine Unknown (comments)     Stomach upset    Penicillin G Unknown (comments)     Reaction as a child (doesn't remember)    Sulfa (Sulfonamide Antibiotics) Unknown (comments)     Blisters/welts around lips/mouth    Tetracycline Unknown (comments)     Raised welts around body and mainly back        Prior to Admission medications    Medication Sig Start Date End Date Taking? Authorizing Provider   amLODIPine (NORVASC) 2.5 mg tablet Take 1 Tablet by mouth daily. Provider, Historical   baclofen (LIORESAL) 10 mg tablet Take 10 mg by mouth as needed for Muscle Spasm(s). Provider, Historical   methylPREDNISolone (Medrol, [place],) 4 mg tablet Take  by mouth. Provider, Historical   infliximab (REMICADE IV) Remicade    Provider, Historical   ramipriL (ALTACE) 2.5 mg capsule Take 5 mg by mouth daily. 9/16/20   Provider, Historical   escitalopram oxalate (LEXAPRO) 5 mg tablet TAKE 1 AND 1/2 TABLETS BY MOUTH EVERY DAY AT BEDTIME 8/27/20   Provider, Historical   onabotulinumtoxinA (BOTOX) 100 unit injection 100 Units by IntraMUSCular route every three (3) months. Cervical dystonia. G24.3.  emg guided injections to the muscles of her neck bilaterally  Indications: twisting neck spasms 10/1/20   Kwame Wheat DO   pantoprazole (PROTONIX) 40 mg tablet Take 1 Tab by mouth two (2) times a day. Indications: Gastric Ulcer 4/11/17   Cesar Rubio NP   fluorouracil (EFUDEX) 5 % chemo cream Apply  to affected area two (2) times a day. forehead    Provider, Historical   lifitegrast (XIIDRA) 5 % dpet Apply 1 Drop to eye two (2) times a day. Provider, Historical   LORazepam (ATIVAN) 1 mg tablet Take 1-2 mg by mouth nightly. Provider, Historical   codeine-butalbital-acetaminophen-caffeine (FIORICET WITH CODEINE) -26-30 mg per capsule Take 1-2 Caps by mouth every four (4) hours as needed for Headache.     Provider, Historical   acetaminophen (TYLENOL EXTRA STRENGTH) 500 mg tablet Take 500 mg by mouth every six (6) hours as needed for Pain.    Provider, Historical   oxyCODONE-acetaminophen (PERCOCET) 7.5-325 mg per tablet Take 1 Tab by mouth every eight (8) hours as needed for Pain. Provider, Historical   cetirizine (ZYRTEC) 10 mg tablet Take 10 mg by mouth daily as needed for Allergies. Provider, Historical   fish oil-omega-3 fatty acids 340-1,000 mg capsule Take 1 Cap by mouth three (3) times daily. Provider, Historical   carboxymethylcellulose sodium (RETAINE CMC) 0.5 % dpet Administer 1 Drop to both eyes as needed. Uses drops 6-7 times a day. Provider, Historical   tofacitinib (XELJANZ) 5 mg tab Take 5 mg by mouth two (2) times a day. Provider, Historical   cyclobenzaprine (FLEXERIL) 10 mg tablet Take 10 mg by mouth two (2) times a day. Provider, Historical   methotrexate (RHEUMATREX) 2.5 mg tablet Daily total = 18.5 mg every Friday. Pola Corey MD   folic acid (FOLVITE) 1 mg tablet Take 4 mg by mouth daily. Pola Corey MD       Review of Systems:    General, constitutional: neck pain and stiffness  Eyes, vision: negative  Ears, nose, throat: negative  Cardiovascular, heart: negative  Respiratory: negative  Gastrointestinal: negative  Genitourinary: negative  Musculoskeletal: negative  Skin and integumentary: negative  Psychiatric: negative  Endocrine: negative  Neurological: negative, except for HPI  Hematologic/lymphatic: negative  Allergy/immunology: negative    Objective: There were no vitals taken for this visit. Physical Exam:    General:  appears well nourished in no acute distress  Lungs: no labored breathing. Skin: intact    Neurological exam:    Awake, alert, oriented to person, place and time  Recent and remote memory were normal  Attention and concentration were intact  Language was intact.   There was no aphasia  Speech: no dysarthria  Fund of knowledge was preserved    Cranial nerves:   II-XII were tested    Visual fields were full  Eomi, no evidence of nystagmus  Facial motor: normal and symmetric  Hearing intact  SCM strength intact  Tongue: midline without fasciculations    Motor:   No pronator drift    left shoulder lift. She does have decreased range of motion in her neck in all spheres, but notable decreased right rotation and decrease head tilt to the left. She does have a right laterocollis and right rotation. She also does have quite taut cervical paraspinal musculatures. Very similar to her last visit, however the right side was also quite taut today    No evidence of fasciculations    Strength testin out of 5 throughout    There was no resting tremor. she does have an action base essential tremor in her bilateral upper extremities which is mild. Unchanged. It was not apparent when she was writing or drawing today. There is no cogwheel rigidity    Gait: steady. Labs:     Lab Results   Component Value Date/Time    Sodium 139 2017 04:06 AM    Potassium 3.3 (L) 2017 04:06 AM    Chloride 107 2017 04:06 AM    Glucose 90 2017 04:06 AM    BUN 6 2017 04:06 AM    Creatinine 0.61 2017 04:06 AM    Calcium 7.8 (L) 2017 04:06 AM    WBC 5.3 2017 04:06 AM    HCT 30.6 (L) 2017 04:06 AM    HGB 10.1 (L) 2017 04:06 AM    PLATELET 522 5386 04:06 AM       Imaging:    No results found for this or any previous visit. Results from [place] encounter on 17    CT ABD PELV W CONT    Narrative  EXAM:  CT abdomen pelvis with contrast    INDICATION: Upper abdominal pain for 2 hours. COMPARISON: CT 2007. TECHNIQUE: Helical CT of the abdomen  and pelvis  following the uneventful  intravenous administration of nonionic contrast.  Coronal and sagittal reformats  are performed. CT dose reduction was achieved through use of a standardized  protocol tailored for this examination and automatic exposure control for dose  modulation. Adaptive statistical iterative reconstruction (ASIR) was utilized.     FINDINGS:  The visualized lung bases demonstrate no mass or consolidation. There is a  stable benign 3 mm nodule in the left lower lobe. The heart size is normal.  There is no pericardial or pleural effusion. The liver, spleen, pancreas, and adrenal glands are normal. The kidneys are  symmetric without hydronephrosis. The gall bladder is present  without intra- or  extra-hepatic biliary dilatation. There is distal gastric wall thickening with mild adjacent fluid and fat  stranding. There are multiple fluid-filled but nondilated small bowel loops. There are no dilated bowel loops. The appendix is not well seen. There are no  enlarged lymph nodes. There is no free air. The aorta is tortuous but tapers  without aneurysm. The urinary bladder is normal.  There is no pelvic mass. The bony structures are age-appropriate. Impression  IMPRESSION:  Findings suggestive of gastroenteritis likely attributable to nonspecific  infection or inflammation. There is no bowel obstruction. Assessment and Plan:    The patient is a pleasant 72-year-old female with significant neck and shoulder pain with resultant headaches. Her examination does reveal preserved strength and intact reflexes. She does have decreased range of motion in her cervical spine and a cervical dystonia that is present. Cervical dystonia:    She has failed multiple oral medications and physical therapy. She continues to do stretching daily. Given the severity of symptoms and impact this has had to daily life as well as ineffectiveness of oral medications, botulinum toxin therapy would continue to be indicated for her.   She does have continued positive response to the botulinum toxin      I plan to injection   90  units   Units for the diagnosis of cervical dystonia    Consent performed and placed in medical records    Timeout was performed    All injections were performed under emg guidance unless otherwise indicated    Each vial of botulinum toxin was reconstituted with 2 cc of normal saline    Lot number:  [id number]  Expiration date:  04/2024    Procedure:botulinum toxin injections. EMG guidance was utilized for all injections unless otherwise noted. Right side:     Muscle Number of units Number of sites Total units injected   Sternocleidomastoid         Spenius Capitus 20 1 20   Levator Scapulae         Scalene 15 1 15   Splenius Cervicus         Longissimus  10 1 10   Trapezius            Left side:      Muscle Number of units Number of sites Total units injected   Sternocleidomastoid         Spenius Capitus         Levator Scapulae 20 1 20   Scalene         Splenius Cervicus         Longissimus 10 1 10   Trapezius 15 1 15         Amount of botulinum toxin injected:90 units     Amount of wastage: 10 units     Total amount of botulinum toxin:  100 units     The patient tolerated the procedure and there was no immediate complications. Blood loss minimal.        The patient was told to call the office or go to the nearest emergency room if side effects arise     The patient will return in 3 months for re-evaluation and consideration of future injections     Chronic headaches:  She will continue with Fiorinal.     Essential tremor: This was not discussed at today's visit.   I had discussed previously with her that this was not a tremor that was based in Parkinsonism       Arthritis and multiple musculoskeletal complaints and degenerative spine disease :  Continue to follow with rheumatology           Patient Active Problem List   Diagnosis Code    PUD (peptic ulcer disease) K27.9    RA (rheumatoid arthritis) ([facility] M06.9                Signed By:  Misty Hadley DO FAAN    October 18, 2022

## 2022-10-19 ENCOUNTER — OFFICE VISIT (OUTPATIENT)
Dept: NEUROLOGY | Age: 72
End: 2022-10-19
Payer: MEDICARE

## 2022-10-19 DIAGNOSIS — G24.3 CERVICAL DYSTONIA: Primary | ICD-10-CM

## 2022-10-19 PROCEDURE — 95874 GUIDE NERV DESTR NEEDLE EMG: CPT | Performed by: PSYCHIATRY & NEUROLOGY

## 2022-10-19 PROCEDURE — 64616 CHEMODENERV MUSC NECK DYSTON: CPT | Performed by: PSYCHIATRY & NEUROLOGY

## 2022-10-19 NOTE — LETTER
10/19/2022    Patient: Mahi Tinsley   YOB: 1950   Date of Visit: 10/19/2022     Bailey Patterson MD  [address] [place] [id number]  Via Fax: [phone]    Dear Bailey Patterson MD,      Thank you for referring Ms. Twin Bronson to [address] [place] [address] for evaluation. My notes for this consultation are attached. If you have questions, please do not hesitate to call me. I look forward to following your patient along with you.       Sincerely,    Kwame Wheat, DO

## 2022-11-04 ENCOUNTER — TELEPHONE (OUTPATIENT)
Dept: NEUROLOGY | Age: 72
End: 2022-11-04

## 2022-11-04 NOTE — TELEPHONE ENCOUNTER
Patient [place] be out of the country and has asked that her Botox appt be r/s soon after January 31st. Please call [phone]

## 2022-11-10 NOTE — TELEPHONE ENCOUNTER
Message  Received: Today  Mike Wheat DO sent to Justyn King LPN  Caller: Unspecified (6 days ago,  4:01 PM)  [id number] [address] I know we created a list of possible dates. Do we have some available? thanks         2/6/23 at noon would be closest to pt's date.

## 2022-11-14 NOTE — TELEPHONE ENCOUNTER
Message  Received: Today  Kayleigh Wheat DO sent to Rhona Ruiz LPN  Caller: Unspecified (1 week ago)  I am not covering the hospital on February 6th. It can be scheduled on 2/2/2022 at 12:30. thanks         Called pt and left voice message advising that I received her message stating she will be out of the country on her Botox day and it needs to be rescheduled. Advised DR. Teagan Salazar stated he can do the Botox on 2/2/22 at 12:30 pm. Advised once scheduled I will send out a reminder to pt.

## 2023-01-05 ENCOUNTER — TELEPHONE (OUTPATIENT)
Dept: NEUROLOGY | Age: 73
End: 2023-01-05

## 2023-01-05 NOTE — TELEPHONE ENCOUNTER
Returned call,verified pt with two pt identifiers, pt advised she will be out of country until the last day of this month. She advised [facility] usually calls her by now for her Botox. Advised I have not set up the shipment process yet because we are a month out. I advised as long as she can give [facility] consent while she is out of the country to get her Botox shipped to us then we should be ok. Pt advised that should not be an issue. Pt verbalized understanding.

## 2023-01-15 ENCOUNTER — TELEPHONE (OUTPATIENT)
Dept: NEUROLOGY | Age: 73
End: 2023-01-15

## 2023-01-15 NOTE — TELEPHONE ENCOUNTER
Botox [id number] submitted to same plan with same response as last year - Drug is covered by current benefit plan. No further PA activity needed    Key: [id number]    Rx will need to be sent to [facility] or escribed to [facility] at [place], [place]. Karen,     You could also email Preet [address] [place] [address] w/ [facility] and have him keep an eye on it as well. Preet Govea Sr.  DC/DE/MD/VA  Physician Engagement  Multiple Sclerosis  Neurology  [facility]  Mobile: [phone]  Fax: [phone]  Email: [email or web address]

## 2023-01-16 ENCOUNTER — TELEPHONE (OUTPATIENT)
Dept: NEUROLOGY | Age: 73
End: 2023-01-16

## 2023-01-16 NOTE — TELEPHONE ENCOUNTER
Called [facility] to set up Botox delivery. Spoke to Paul- needed a new Botox Rx sent in. Transferred to Sandie Kennedy - pharmacist for verbal order of Botox. She verbalized the Rx back to me and verbalized understanding. Called [facility] again to set up the Botox delivery. Spoke to [employer] ? And could not set up delivery due to needing to have the script update in system for 5-8 days before I can order. Pt is also needing to give consent. Will need to call back. Sent pt a my chart message regarding this matter.

## 2023-01-23 ENCOUNTER — TELEPHONE (OUTPATIENT)
Dept: NEUROLOGY | Age: 73
End: 2023-01-23

## 2023-01-23 NOTE — TELEPHONE ENCOUNTER
Called [facility] to set up Botox delivery. Spoke to Rudolph and they are trying to move it from one location to another to be processed. She advised with the different insurance programs the Rx is stuck in the process and didn't get moved to the next processing so I could order the Botox. She advised to call back in 2 days and see then if I can order the medication.

## 2023-01-24 ENCOUNTER — TELEPHONE (OUTPATIENT)
Dept: NEUROLOGY | Age: 73
End: 2023-01-24

## 2023-01-24 NOTE — TELEPHONE ENCOUNTER
[facility]. [facility]  She advised  delivery date of 1/26/23 for Botox.

## 2023-01-26 ENCOUNTER — DOCUMENTATION ONLY (OUTPATIENT)
Dept: NEUROLOGY | Age: 73
End: 2023-01-26

## 2023-02-01 NOTE — PROGRESS NOTES
Neurology Note    Patient ID:  Prerna Woodruff  [id number]  81 y.o.  1950      Date of Consultati on:  February 1, 2023      Subjective: I am here for botox. My tremor is bothering me more       History of Present Illness:   Prerna Woodruff is a 81 y.o. female who returns to the neurology clinic at [facility] for an evaluation. She received her last botox on October 19, 2022. Please see my history of present illness, examination, and treatment base plan from that day. She has a history of cervical dystonia and worsening neck pain, decreased range of motion and headaches. The patient tolerated her last injection well and had no untoward side effects. She noticed a significant improvement in her range of motion and her quality of life was improved. She is only began to notice the medicine wearing off a slight bit but is still much improved since prior to receiving the botulinum toxin injection. There has been no new medical conditions that have arisen. She is very interested in receiving the medication again today.       Past Medical History:   Diagnosis Date    Autoimmune disease ([address]     rheumatoid arthritis    Ill-defined condition     Spondylosis    Osteoarthritis     Skin cancer         Past Surgical History:   Procedure Laterality Date    HX APPENDECTOMY      HX GYN      right ovary and fallopian tube removed    HX KNEE ARTHROSCOPY Left     HX ORTHOPAEDIC      back surgery        Family History   Problem Relation Age of Onset    Cancer Father         Social History     Tobacco Use    Smoking status: Former    Smokeless tobacco: Never   Substance Use Topics    Alcohol use: Not on file     Comment: a glass of wine per night        Allergies   Allergen Reactions    Codeine Unknown (comments)     Stomach upset    Penicillin G Unknown (comments)     Reaction as a child (doesn't remember)    Sulfa (Sulfonamide Antibiotics) Unknown (comments)     Blisters/welts around lips/mouth    Tetracycline Unknown (comments)     Raised welts around body and mainly back        Prior to Admission medications    Medication Sig Start Date End Date Taking? Authorizing Provider   amLODIPine (NORVASC) 2.5 mg tablet Take 1 Tablet by mouth daily. Provider, Historical   baclofen (LIORESAL) 10 mg tablet Take 10 mg by mouth as needed for Muscle Spasm(s). Provider, Historical   methylPREDNISolone (Medrol, [place],) 4 mg tablet Take  by mouth. Provider, Historical   infliximab (REMICADE IV) Remicade    Provider, Historical   ramipriL (ALTACE) 2.5 mg capsule Take 5 mg by mouth daily. 9/16/20   Provider, Historical   escitalopram oxalate (LEXAPRO) 5 mg tablet TAKE 1 AND 1/2 TABLETS BY MOUTH EVERY DAY AT BEDTIME 8/27/20   Provider, Historical   onabotulinumtoxinA (BOTOX) 100 unit injection 100 Units by IntraMUSCular route every three (3) months. Cervical dystonia. G24.3.  emg guided injections to the muscles of her neck bilaterally  Indications: twisting neck spasms 10/1/20   Kwame Wheat DO   pantoprazole (PROTONIX) 40 mg tablet Take 1 Tab by mouth two (2) times a day. Indications: Gastric Ulcer 4/11/17   Allen Hole, NP   fluorouracil (EFUDEX) 5 % chemo cream Apply  to affected area two (2) times a day. forehead    Provider, Historical   lifitegrast (XIIDRA) 5 % dpet Apply 1 Drop to eye two (2) times a day. Provider, Historical   LORazepam (ATIVAN) 1 mg tablet Take 1-2 mg by mouth nightly. Provider, Historical   codeine-butalbital-acetaminophen-caffeine (FIORICET WITH CODEINE) -41-30 mg per capsule Take 1-2 Caps by mouth every four (4) hours as needed for Headache. Provider, Historical   acetaminophen (TYLENOL EXTRA STRENGTH) 500 mg tablet Take 500 mg by mouth every six (6) hours as needed for Pain. Provider, Historical   oxyCODONE-acetaminophen (PERCOCET) 7.5-325 mg per tablet Take 1 Tab by mouth every eight (8) hours as needed for Pain.     Provider, Historical   cetirizine (ZYRTEC) 10 mg tablet Take 10 mg by mouth daily as needed for Allergies. Provider, Historical   fish oil-omega-3 fatty acids 340-1,000 mg capsule Take 1 Cap by mouth three (3) times daily. Provider, Historical   carboxymethylcellulose sodium (RETAINE CMC) 0.5 % dpet Administer 1 Drop to both eyes as needed. Uses drops 6-7 times a day. Provider, Historical   tofacitinib (XELJANZ) 5 mg tab Take 5 mg by mouth two (2) times a day. Provider, Historical   cyclobenzaprine (FLEXERIL) 10 mg tablet Take 10 mg by mouth two (2) times a day. Provider, Historical   methotrexate (RHEUMATREX) 2.5 mg tablet Daily total = 18.5 mg every Friday. Leora, MD Pola   folic acid (FOLVITE) 1 mg tablet Take 4 mg by mouth daily. Pola Corey MD       Review of Systems:    General, constitutional: neck pain and stiffness  Eyes, vision: negative  Ears, nose, throat: negative  Cardiovascular, heart: negative  Respiratory: negative  Gastrointestinal: negative  Genitourinary: negative  Musculoskeletal: negative  Skin and integumentary: negative  Psychiatric: negative  Endocrine: negative  Neurological: negative, except for HPI  Hematologic/lymphatic: negative  Allergy/immunology: negative    Objective: There were no vitals taken for this visit. Physical Exam:    General:  appears well nourished in no acute distress  Lungs: no labored breathing. Skin: intact    Neurological exam:    Awake, alert, oriented to person, place and time  Recent and remote memory were normal  Attention and concentration were intact  Language was intact. There was no aphasia  Speech: no dysarthria  Fund of knowledge was preserved    Cranial nerves:   II-XII were tested    Visual fields were full  Eomi, no evidence of nystagmus  Facial motor: normal and symmetric  Hearing intact  SCM strength intact  Tongue: midline without fasciculations    Motor:   No pronator drift    left shoulder lift.  She does have decreased range of motion in her neck in all spheres, but notable decreased right rotation and decrease head tilt to the left. She does have a right laterocollis and right rotation. She also does have quite taut cervical paraspinal musculatures. This is similar to her last visit    No evidence of fasciculations    Strength testin out of 5 throughout    There was no resting tremor. she does have an action base essential tremor in her bilateral upper extremities which is mild. Unchanged. It was not apparent when she was writing or drawing today. There is no cogwheel rigidity    Gait: steady. Labs:     Lab Results   Component Value Date/Time    Sodium 139 2017 04:06 AM    Potassium 3.3 (L) 2017 04:06 AM    Chloride 107 2017 04:06 AM    Glucose 90 2017 04:06 AM    BUN 6 2017 04:06 AM    Creatinine 0.61 2017 04:06 AM    Calcium 7.8 (L) 2017 04:06 AM    WBC 5.3 2017 04:06 AM    HCT 30.6 (L) 2017 04:06 AM    HGB 10.1 (L) 2017 04:06 AM    PLATELET 649  04:06 AM       Imaging:    No results found for this or any previous visit. Results from [place] encounter on 17    CT ABD PELV W CONT    Narrative  EXAM:  CT abdomen pelvis with contrast    INDICATION: Upper abdominal pain for 2 hours. COMPARISON: CT 2007. TECHNIQUE: Helical CT of the abdomen  and pelvis  following the uneventful  intravenous administration of nonionic contrast.  Coronal and sagittal reformats  are performed. CT dose reduction was achieved through use of a standardized  protocol tailored for this examination and automatic exposure control for dose  modulation. Adaptive statistical iterative reconstruction (ASIR) was utilized. FINDINGS:  The visualized lung bases demonstrate no mass or consolidation. There is a  stable benign 3 mm nodule in the left lower lobe. The heart size is normal.  There is no pericardial or pleural effusion.     The liver, spleen, pancreas, and adrenal glands are normal. The kidneys are  symmetric without hydronephrosis. The gall bladder is present  without intra- or  extra-hepatic biliary dilatation. There is distal gastric wall thickening with mild adjacent fluid and fat  stranding. There are multiple fluid-filled but nondilated small bowel loops. There are no dilated bowel loops. The appendix is not well seen. There are no  enlarged lymph nodes. There is no free air. The aorta is tortuous but tapers  without aneurysm. The urinary bladder is normal.  There is no pelvic mass. The bony structures are age-appropriate. Impression  IMPRESSION:  Findings suggestive of gastroenteritis likely attributable to nonspecific  infection or inflammation. There is no bowel obstruction. Assessment and Plan:    The patient is a pleasant 72-year-old female with significant neck and shoulder pain with resultant headaches. Her examination does reveal preserved strength and intact reflexes. She does have decreased range of motion in her cervical spine and a cervical dystonia that is present. Cervical dystonia:    She has failed multiple oral medications and physical therapy. She continues to do stretching daily. Given the severity of symptoms and impact this has had to daily life as well as ineffectiveness of oral medications, botulinum toxin therapy would continue to be indicated for her. She does have continued positive response to the botulinum toxin      I plan to injection   90  units   Units for the diagnosis of cervical dystonia    Consent performed and placed in medical records    Timeout was performed    All injections were performed under emg guidance unless otherwise indicated    Each vial of botulinum toxin was reconstituted with 2 cc of normal saline    Lot number:  [id number]  Expiration date:  04/2025    Procedure:botulinum toxin injections. EMG guidance was utilized for all injections unless otherwise noted.    Right side:     Muscle Number of units Number of sites Total units injected   Sternocleidomastoid         Spenius Capitus 20 1 20   Levator Scapulae         Scalene 15 1 15   Splenius Cervicus         Longissimus  10 1 10   Trapezius            Left side:      Muscle Number of units Number of sites Total units injected   Sternocleidomastoid         Spenius Capitus         Levator Scapulae 20 1 20   Scalene         Splenius Cervicus         Longissimus 10 1 10   Trapezius 15 1 15         Amount of botulinum toxin injected:90 units     Amount of wastage: 10 units     Total amount of botulinum toxin:  100 units     The patient tolerated the procedure and there was no immediate complications. Blood loss minimal.        The patient was told to call the office or go to the nearest emergency room if side effects arise     The patient will return in 3 months for re-evaluation and consideration of future injections     Chronic headaches:  She will continue with Fiorinal.     Essential tremor: This was not discussed at today's visit.   I had discussed previously with her that this was not a tremor that was based in Parkinsonism       Arthritis and multiple musculoskeletal complaints and degenerative spine disease :  Continue to follow with rheumatology           Patient Active Problem List   Diagnosis Code    PUD (peptic ulcer disease) K27.9    RA (rheumatoid arthritis) ([facility] M06.9                Signed By:  Mady Gomez DO FAAN    February 1, 2023

## 2023-02-02 ENCOUNTER — OFFICE VISIT (OUTPATIENT)
Dept: NEUROLOGY | Age: 73
End: 2023-02-02
Payer: MEDICARE

## 2023-02-02 DIAGNOSIS — G24.3 CERVICAL DYSTONIA: Primary | ICD-10-CM

## 2023-02-02 PROCEDURE — 95874 GUIDE NERV DESTR NEEDLE EMG: CPT | Performed by: PSYCHIATRY & NEUROLOGY

## 2023-02-02 PROCEDURE — 64616 CHEMODENERV MUSC NECK DYSTON: CPT | Performed by: PSYCHIATRY & NEUROLOGY

## 2023-02-02 NOTE — LETTER
2/2/2023    Patient: Cristhian Bains   YOB: 1950   Date of Visit: 2/2/2023     Gamaliel Jerez MD  [address] [place] [id number]  Via Fax: [phone]    Dear Gamaliel Jerez MD,      Thank you for referring Ms. Sarita Lucas to [address] [place] [address] for evaluation. My notes for this consultation are attached. If you have questions, please do not hesitate to call me. I look forward to following your patient along with you.       Sincerely,    Kwame Wheat, DO

## 2023-02-06 ENCOUNTER — TELEPHONE (OUTPATIENT)
Dept: NEUROLOGY | Age: 73
End: 2023-02-06

## 2023-02-06 NOTE — TELEPHONE ENCOUNTER
SYLVIA      Pt has botox appt scheduled for 5/5/23. [place]. Wants to r/s for 5/16/23 or after. Please call.  [phone]

## 2023-02-07 NOTE — TELEPHONE ENCOUNTER
You  Alyse Carlton 23 hours ago (5:14 PM)     DR Barrett evening,        The next available Botox day would be May 26,2023 at 1 pm, 1:30 pm, 2 pm, 3 pm or 3:30 pm. Would you like to reschedule to one of those appt slots? Karen HAMEED        Called pt,verified pt with two pt identifiers, advised pt I am calling to reschedule her Botox appt. Read out the date and times to her. Pt agreed to May 26,23 at 1 pm. She verbalized understanding and would be here. Canceled other appt and rescheduled. Put appt reminder in mail to pt.

## 2023-04-27 ENCOUNTER — DOCUMENTATION ONLY (OUTPATIENT)
Dept: NEUROLOGY | Age: 73
End: 2023-04-27

## 2023-04-27 NOTE — PROGRESS NOTES
Botox came in the office:  4/27/23  MFR: [place]  LOT:  [id number]  Exp:  09/ 2025  Appointment:  5/26/23  Provider: Sedrick Cruz  Specialty pharmacy: [id number] Phone Number: [phone]

## 2023-05-22 ENCOUNTER — TELEPHONE (OUTPATIENT)
Age: 73
End: 2023-05-22

## 2023-05-22 NOTE — TELEPHONE ENCOUNTER
Botox delivered to office 4/27/23. From previous delivery this year:  Botox  submitted to same plan with same response as last year - Drug is covered by current benefit plan.  No further PA activity needed/ Key: [id number]    CPT - Medicare No PA required    SPP [facility]

## 2023-05-24 NOTE — PROGRESS NOTES
are normal. The kidneys are  symmetric without hydronephrosis. The gall bladder is present  without intra- or  extra-hepatic biliary dilatation. There is distal gastric wall thickening with mild adjacent fluid and fat  stranding. There are multiple fluid-filled but nondilated small bowel loops. There are no dilated bowel loops. The appendix is not well seen. There are no  enlarged lymph nodes. There is no free air. The aorta is tortuous but tapers  without aneurysm. The urinary bladder is normal.  There is no pelvic mass. The bony structures are age-appropriate. Impression  IMPRESSION:  Findings suggestive of gastroenteritis likely attributable to nonspecific  infection or inflammation. There is no bowel obstruction. Assessment and Plan:    The patient is a pleasant 72-year-old female with significant neck and shoulder pain with resultant headaches. Her examination does reveal preserved strength and intact reflexes. She does have decreased range of motion in her cervical spine and a cervical dystonia that is present. Cervical dystonia:    She has failed multiple oral medications and physical therapy. She continues to do stretching daily. Given the severity of symptoms and impact this has had to daily life as well as ineffectiveness of oral medications, botulinum toxin therapy would continue to be indicated for her. She does have continued positive response to the botulinum toxin      I plan to injection   90  units   Units for the diagnosis of cervical dystonia    Consent performed and placed in medical records    Timeout was performed    All injections were performed under emg guidance unless otherwise indicated    Each vial of botulinum toxin was reconstituted with 2 cc of normal saline    Lot number:  [id number]  Expiration date:  04/2026    Procedure:botulinum toxin injections. EMG guidance was utilized for all injections unless otherwise noted.      Right side:     Muscle Number

## 2023-05-26 ENCOUNTER — PROCEDURE VISIT (OUTPATIENT)
Age: 73
End: 2023-05-26

## 2023-05-26 DIAGNOSIS — M54.12 CERVICAL RADICULOPATHY AT C5: ICD-10-CM

## 2023-05-26 DIAGNOSIS — G24.3 SPASMODIC TORTICOLLIS: Primary | ICD-10-CM

## 2023-06-20 ENCOUNTER — HOSPITAL ENCOUNTER (OUTPATIENT)
Age: 73
Discharge: HOME | End: 2023-06-23
Payer: MEDICARE

## 2023-06-20 DIAGNOSIS — M54.12 CERVICAL RADICULOPATHY AT C5: ICD-10-CM

## 2023-06-20 PROCEDURE — 72141 MRI NECK SPINE W/O DYE: CPT

## 2023-06-23 ENCOUNTER — TELEPHONE (OUTPATIENT)
Age: 73
End: 2023-06-23

## 2023-06-24 DIAGNOSIS — M54.12 CERVICAL RADICULOPATHY AT C5: Primary | ICD-10-CM

## 2023-06-26 ENCOUNTER — CLINICAL DOCUMENTATION (OUTPATIENT)
Age: 73
End: 2023-06-26

## 2023-07-21 ENCOUNTER — CLINICAL DOCUMENTATION (OUTPATIENT)
Age: 73
End: 2023-07-21

## 2023-07-21 NOTE — PROGRESS NOTES
Botox Arrive in Office: 7/21/23  MFR: Michael Yi  LOT: [id number]  Exp: 11/ 2025  Specialty Pharmacy: [facility]  Specialty phone #: [phone]  Provider: Jerrod Wu Botox Appt: 8/16/23

## 2023-08-15 NOTE — PROGRESS NOTES
Neurology Note    Patient ID:  Nash Barboza  [id number]  81 y.o.  1950          Subjective: I am here for botox. My neck is beginning to hurt more. History of Present Illness:   Nash Barboza is a 81 y.o. female who returns to the neurology clinic at [facility] for an evaluation. She received her last botox on 5/26/2023   Please see my history of present illness, examination, and treatment base plan from that day. She has a history of cervical dystonia and worsening neck pain, decreased range of motion and headaches. The patient tolerated her last injection well and had no untoward side effects. She noticed a significant improvement in her range of motion and her quality of life was improved. This did begin to wear off over the last few days. She continues to  worsening neck pain which increases with head rotation. She is also noticing increasing pain up the posterior portion of her occiput which is producing headaches. She did get a mri of her cervical spine after her last visit. Showed severe disease. She is scheduled to see neurosurgery at [address] [place] [address]. There has been no new numbness, tingling, or weakness. There has been no new medical conditions that have arisen. She is very interested in receiving the medication again today.       Past Medical History:   Diagnosis Date    Autoimmune disease ([address])     rheumatoid arthritis    Ill-defined condition     Spondylosis    Osteoarthritis     Skin cancer         Past Surgical History:   Procedure Laterality Date    HX APPENDECTOMY      HX GYN      right ovary and fallopian tube removed    HX KNEE ARTHROSCOPY Left     HX ORTHOPAEDIC      back surgery        Family History   Problem Relation Age of Onset    Cancer Father         Social History     Tobacco Use    Smoking status: Former    Smokeless tobacco: Never   Substance Use Topics    Alcohol use: Not on file     Comment: a glass of wine per night        Allergies

## 2023-08-16 ENCOUNTER — HOSPITAL ENCOUNTER (OUTPATIENT)
Age: 73
Discharge: HOME | End: 2023-08-19
Payer: MEDICARE

## 2023-08-16 ENCOUNTER — PROCEDURE VISIT (OUTPATIENT)
Age: 73
End: 2023-08-16
Payer: MEDICARE

## 2023-08-16 DIAGNOSIS — G24.3 SPASMODIC TORTICOLLIS: Primary | ICD-10-CM

## 2023-08-16 DIAGNOSIS — M54.9 BACK PAIN, UNSPECIFIED BACK LOCATION, UNSPECIFIED BACK PAIN LATERALITY, UNSPECIFIED CHRONICITY: ICD-10-CM

## 2023-08-16 PROCEDURE — 64616 CHEMODENERV MUSC NECK DYSTON: CPT | Performed by: PSYCHIATRY & NEUROLOGY

## 2023-08-16 PROCEDURE — 72110 X-RAY EXAM L-2 SPINE 4/>VWS: CPT

## 2023-08-16 PROCEDURE — 72070 X-RAY EXAM THORAC SPINE 2VWS: CPT

## 2023-08-16 PROCEDURE — 71046 X-RAY EXAM CHEST 2 VIEWS: CPT

## 2023-08-16 PROCEDURE — 95874 GUIDE NERV DESTR NEEDLE EMG: CPT | Performed by: PSYCHIATRY & NEUROLOGY

## 2023-09-11 ENCOUNTER — CLINICAL DOCUMENTATION (OUTPATIENT)
Age: 73
End: 2023-09-11

## 2023-09-11 NOTE — PROGRESS NOTES
Received [facility] notes from Neurosurgical.  reviewed and signed. Pl[facility] in slow scan [facility]

## 2023-10-12 ENCOUNTER — CLINICAL DOCUMENTATION (OUTPATIENT)
Age: 73
End: 2023-10-12

## 2023-10-12 NOTE — PROGRESS NOTES
Received [facility] notes on pt from Neurosurgeon.  reviewed and signed. [facility].

## 2023-10-17 ENCOUNTER — CLINICAL DOCUMENTATION (OUTPATIENT)
Age: 73
End: 2023-10-17

## 2023-10-17 NOTE — PROGRESS NOTES
Botox Arrive in Office: 10/17/23  MFR: [place]  LOT: [id number]  Exp: 12/ 2025  Specialty Pharmacy: [facility]  Specialty phone #: [phone]  Provider: Jase Wu Botox Appt:  11/3/23

## 2023-11-02 NOTE — PROGRESS NOTES
04:06 AM    Creatinine 0.61 04/11/2017 04:06 AM    Calcium 7.8 (L) 04/11/2017 04:06 AM    WBC 5.3 04/11/2017 04:06 AM    HCT 30.6 (L) 04/11/2017 04:06 AM    HGB 10.1 (L) 04/11/2017 04:06 AM    PLATELET 711 29/16/9552 04:06 AM       Imaging:    No results found for this or any previous visit. Results from [address] encounter on 04/09/17    CT ABD PELV W CONT    Narrative  EXAM:  CT abdomen pelvis with contrast    INDICATION: Upper abdominal pain for 2 hours. COMPARISON: CT 8/1/2007. TECHNIQUE: Helical CT of the abdomen  and pelvis  following the uneventful  intravenous administration of nonionic contrast.  Coronal and sagittal reformats  are performed. CT dose reduction was achieved through use of a standardized  protocol tailored for this examination and automatic exposure control for dose  modulation. Adaptive statistical iterative reconstruction (ASIR) was utilized. FINDINGS:  The visualized lung bases demonstrate no mass or consolidation. There is a  stable benign 3 mm nodule in the left lower lobe. The heart size is normal.  There is no pericardial or pleural effusion. The liver, spleen, pancreas, and adrenal glands are normal. The kidneys are  symmetric without hydronephrosis. The gall bladder is present  without intra- or  extra-hepatic biliary dilatation. There is distal gastric wall thickening with mild adjacent fluid and fat  stranding. There are multiple fluid-filled but nondilated small bowel loops. There are no dilated bowel loops. The appendix is not well seen. There are no  enlarged lymph nodes. There is no free air. The aorta is tortuous but tapers  without aneurysm. The urinary bladder is normal.  There is no pelvic mass. The bony structures are age-appropriate. Impression  IMPRESSION:  Findings suggestive of gastroenteritis likely attributable to nonspecific  infection or inflammation. There is no bowel obstruction. Assessment and Plan:     The

## 2023-11-03 ENCOUNTER — PROCEDURE VISIT (OUTPATIENT)
Age: 73
End: 2023-11-03

## 2023-11-03 DIAGNOSIS — G24.3 SPASMODIC TORTICOLLIS: Primary | ICD-10-CM

## 2023-11-24 ENCOUNTER — TELEPHONE (OUTPATIENT)
Age: 73
End: 2023-11-24

## 2023-11-29 ENCOUNTER — TELEPHONE (OUTPATIENT)
Age: 73
End: 2023-11-29

## 2023-11-29 NOTE — TELEPHONE ENCOUNTER
Patient states that she would be out of the country in [facility] on 1/26 and would like to reschedule her apptmt to sometime on or at 2/4/24 anytime.  She can be reached at [phone]

## 2023-12-01 NOTE — TELEPHONE ENCOUNTER
"Lyly Draper is a 82 y.o. female     Subjective   This patient presents today for follow-up of her left upper arm AV fistula.  She states that her arm is swollen as usual but she denies any significant pain.  She denies any fever chills nausea vomiting or headache.  She states that dialysis is actually going relatively well.         Objective     Vitals:    11/30/23 1032   BP: 120/60   Pulse: 58      Physical Exam  This patient is alert and oriented x 3.  She is in no acute distress.  Her head is normocephalic.  Pupils are equal and reactive to light and accommodation.  Neck is soft and supple without palpable lymph nodes.  Heart is regular rate.  Lungs are clear.  Abdomen soft with positive bowel sounds there is no pain on palpation.  Her right upper and lower extremities seem to have adequate range of motion.  Her left upper extremity has some decreased range of motion.  The entire volume of the left upper arm is greater than her right.  She does have both thrill and pulse in her left brachial basilic fistula.  She has a couple of aneurysmal areas also that appear to be relatively stable.  Skin turgor seems to be adequate in her lower extremities.  Psychologically she appears to be acting appropriately  Blood pressure 120/60, pulse 58, height 1.6 m (5' 3\"), weight 72.6 kg (160 lb).            There are no problems to display for this patient.         Current Outpatient Medications:     aspirin 81 mg EC tablet, Take 1 tablet (81 mg) by mouth once daily., Disp: , Rfl:     BD Insulin Syringe Ultra-Fine 0.5 mL 31 gauge x 5/16\" syringe, USE AS DIRECTED TWICE A DAY, Disp: , Rfl:     calcium carbonate (Tums Ultra) 400 mg calcium (1,000 mg) chewable tablet, Chew., Disp: , Rfl:     cyanocobalamin (Vitamin B-12) 1,000 mcg tablet, Take 1 tablet (1,000 mcg) by mouth once daily., Disp: , Rfl:     loperamide (Imodium) 2 mg capsule, Take by mouth., Disp: , Rfl:     traMADol (Ultram) 50 mg tablet, Take 1 tablet (50 mg) by mouth " Voicemail:    Patient called to discuss her Botox with Nurse.  Please call [phone] every 6 hours if needed., Disp: , Rfl:     atorvastatin (Lipitor) 40 mg tablet, Take 1 tablet (40 mg) by mouth once daily at bedtime., Disp: , Rfl:     B complex-vitamin C-folic acid ([place] Caps) 1 mg capsule, Take by mouth., Disp: , Rfl:     ergocalciferol (Vitamin D-2) 1.25 MG (79480 UT) capsule, Take 1 capsule (1,250 mcg) by mouth 1 (one) time per week., Disp: , Rfl:     gabapentin (Neurontin) 100 mg capsule, TAKE 2 CAPSULE IN THE MORNING (AFTER DIALYSIS) AND TAKE 2 CAPSULES AT BEDTIME DAILY., Disp: , Rfl:     levothyroxine (Synthroid, Levoxyl) 50 mcg tablet, TAKE 1 TABLET BY MOUTH DAILY AND 2 TABLETS ON SUNDAY, Disp: , Rfl:     lisinopril 2.5 mg tablet, Take 1 tablet (2.5 mg) by mouth once daily in the morning. Take before meals., Disp: , Rfl:     metoprolol tartrate (Lopressor) 25 mg tablet, Take 0.5 tablets (12.5 mg) by mouth once daily., Disp: , Rfl:     NovoLIN N NPH U-100 Insulin 100 unit/mL injection, 15 UNITS SUBCUTANEOUSLY ONCE DAILY 90 DAYS, Disp: , Rfl:      Lab Results   Component Value Date    WBC 6.0 03/13/2023    HGB 9.0 (L) 03/13/2023    HCT 28.0 (L) 03/13/2023     (L) 03/13/2023    CHOL 110 03/06/2023    TRIG 129 03/06/2023    HDL 30.7 (A) 03/06/2023    ALT 11 04/12/2022    AST 13 04/12/2022     (L) 03/13/2023    K 4.2 03/13/2023    CL 92 (L) 03/13/2023    CREATININE 6.22 (H) 03/13/2023    BUN 60 (H) 03/13/2023    CO2 29 03/13/2023    TSH 5.26 (H) 11/07/2023    INR 2.0 (H) 03/07/2023    HGBA1C 6.0 (H) 11/07/2023    HGBA1C 6.0 (H) 11/07/2023       Electrocardiogram 12 Lead    Result Date: 3/5/2023  Normal sinus rhythm Left axis deviation Abnormal ECG No previous ECGs available Confirmed by Osmany Ortiz ([id number]) on 3/5/2023 8:17:32 PM    FL FLUORO historical    Result Date: 3/3/2023      XR chest 1 view    Result Date: 3/2/2023  Interpreted By:  FERNANDO ZAMORA MD MRN: [id number] Patient Name: LUIGI YOUSSEF  STUDY: CHEST 1 VIEW;  3/2/2023 5:43 pm  INDICATION: preop baseline .  COMPARISON:  02/28/2019.  ACCESSION NUMBER(S): [id number]  ORDERING CLINICIAN: CIRO MARTE  FINDINGS:   CARDIOMEDIASTINAL SILHOUETTE: Cardiomediastinal silhouette is normal in size and configuration. There is mitral annular calcifications..  LUNGS: Left basilar atelectasis but no acute airspace disease.  ABDOMEN: No remarkable upper abdominal findings.  BONES: No acute osseous changes.      1. Basilar atelectasis with slight improvement in left basilar aeration. Follow-up as clinically indicated. 2. No evidence of acute cardiopulmonary process.      IR historical    Result Date: 3/2/2023                  Assessment/Plan   Active Problems:  There are no active Hospital Problems.      This patient presents today for follow-up of her left brachial basilic fistula.  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  Her left brachiobasilic fistula seems to be functioning relatively well for her.  She does have chronic swelling of her left upper extremity.  If this fistula becomes a significant problem for her with constant pain and increasing swelling, then she will need creation of a fistula involving her right upper extremity.  The patient is aware of this.  I would like her to follow-up in 3 months      Adis Alford,

## 2024-01-16 ENCOUNTER — CLINICAL DOCUMENTATION (OUTPATIENT)
Age: 74
End: 2024-01-16

## 2024-01-16 ENCOUNTER — TELEPHONE (OUTPATIENT)
Age: 74
End: 2024-01-16

## 2024-01-16 NOTE — TELEPHONE ENCOUNTER
Patient is Traditional Medicare part A and B, no authorization is required for Medical Buy and Bill

## 2024-01-16 NOTE — PROGRESS NOTES
Faxed signed Botox Rx to [facility] to [phone] and received fax confirmation.    Placed in fast scan pile.

## 2024-01-22 NOTE — TELEPHONE ENCOUNTER
Bc, April  You6 days ago     AC  Please refrain from ordering through Pharmacy until we verify if patient is eligible for Buy and Bill. At this time, Buy and Bill is our first preference in ordering for patients, unless insurance states we must go through patients Pharmacy Benefit. If you have already set up shipment with Pharmacy, please continue with shipment, but the following appointment needs to be Buy and Bill. Thank you!         Noted, Botox has not been ordered. A Rx was sent to specialty pharmacy since appt is not until 2/16/24.   Pt can be Buy and Bill for Botox.

## 2024-02-15 NOTE — PROGRESS NOTES
throat: negative  Cardiovascular, heart: negative  Respiratory: negative  Gastrointestinal: negative  Genitourinary: negative  Musculoskeletal: negative  Skin and integumentary: negative  Psychiatric: negative  Endocrine: negative  Neurological: negative, except for HPI  Hematologic/lymphatic: negative  Allergy/immunology: negative    Objective:     There were no vitals taken for this visit.     Physical Exam:    General:  appears well nourished in no acute distress  Lungs: no labored breathing.   Skin: intact    Neurological exam:    Awake, alert, oriented to person, place and time  Recent and remote memory were normal  Attention and concentration were intact  Language was intact.  There was no aphasia  Speech: no dysarthria  Fund of knowledge was preserved    Cranial nerves:   II-XII were tested    Visual fields were full  Eomi, no evidence of nystagmus  Facial motor: normal and symmetric  Hearing intact  SCM strength intact  Tongue: midline without fasciculations    Motor:   No pronator drift    left shoulder lift. She does have decreased range of motion in her neck in all spheres, but notable decreased right rotation and decrease head tilt to the left.  She does have a right laterocollis and right rotation.  She also does have quite taut cervical paraspinal musculatures.     No evidence of fasciculations    Strength testin out of 5 throughout    There was no resting tremor. she does have an action base essential tremor in her bilateral upper extremities which is mild. Unchanged.    Gait: steady.     Labs:     Lab Results   Component Value Date/Time    Sodium 139 2017 04:06 AM    Potassium 3.3 (L) 2017 04:06 AM    Chloride 107 2017 04:06 AM    Glucose 90 2017 04:06 AM    BUN 6 2017 04:06 AM    Creatinine 0.61 2017 04:06 AM    Calcium 7.8 (L) 2017 04:06 AM    WBC 5.3 2017 04:06 AM    HCT 30.6 (L) 2017 04:06 AM    HGB 10.1 (L) 2017 04:06 AM

## 2024-02-16 ENCOUNTER — PROCEDURE VISIT (OUTPATIENT)
Age: 74
End: 2024-02-16

## 2024-02-16 DIAGNOSIS — G24.3 SPASMODIC TORTICOLLIS: Primary | ICD-10-CM

## 2024-03-21 RX ORDER — PROPRANOLOL HCL 60 MG
60 CAPSULE, EXTENDED RELEASE 24HR ORAL DAILY
Qty: 30 CAPSULE | Refills: 3 | Status: SHIPPED | OUTPATIENT
Start: 2024-03-21

## 2024-05-16 NOTE — PROGRESS NOTES
a pleasant  female with significant neck and shoulder pain with resultant headaches.  Her examination does reveal preserved strength and intact reflexes.  She does have decreased range of motion in her cervical spine and a cervical dystonia     Cervical dystonia:    She has failed multiple oral medications and physical therapy.  She continues to do stretching daily.  Given the severity of symptoms and impact this has had to daily life as well as ineffectiveness of oral medications, botulinum toxin therapy would continue to be indicated for her.  She does have continued positive response to the botulinum toxin    I plan to injection   100  units   Units for the diagnosis of cervical dystonia  Consent performed and placed in medical records  Timeout was performed  All injections were performed under emg guidance unless otherwise indicated  Each vial of botulinum toxin was reconstituted with 2 cc of normal saline    Lot number:  [id number]  Expiration date:  04/26    Procedure:botulinum toxin injections.  EMG guidance was utilized for all injections unless otherwise noted.     Right side:     Muscle Number of units Number of sites Total units injected   Sternocleidomastoid         Spenius Capitus 20 1 20   Levator Scapulae         Scalene 15 1 15   Splenius Cervicus         Longissimus  15 1 15   Trapezius            Left side:      Muscle Number of units Number of sites Total units injected   Sternocleidomastoid         Spenius Capitus         Levator Scapulae 20 1 20   Scalene         Splenius Cervicus         Longissimus 15 1 15   Trapezius 15 1 15         Amount of botulinum toxin injected:100 units  Amount of wastage: 0 units  Total amount of botulinum toxin:  100 units     The patient tolerated the procedure and there was no immediate complications.     Blood loss minimal.  The patient was told to call the office or go to the nearest emergency room if side effects arise  The patient will return in 3 months for

## 2024-05-17 ENCOUNTER — PROCEDURE VISIT (OUTPATIENT)
Age: 74
End: 2024-05-17

## 2024-05-17 DIAGNOSIS — G24.3 CERVICAL DYSTONIA: Primary | ICD-10-CM

## 2024-06-19 RX ORDER — PROPRANOLOL HCL 60 MG
CAPSULE, EXTENDED RELEASE 24HR ORAL DAILY
Qty: 90 CAPSULE | Refills: 1 | OUTPATIENT
Start: 2024-06-19

## 2024-07-11 ENCOUNTER — HOSPITAL ENCOUNTER (OUTPATIENT)
Age: 74
Discharge: HOME | End: 2024-07-11
Payer: MEDICARE

## 2024-07-11 DIAGNOSIS — R06.00 DYSPNEA AND RESPIRATORY ABNORMALITY: ICD-10-CM

## 2024-07-11 DIAGNOSIS — R06.89 DYSPNEA AND RESPIRATORY ABNORMALITY: ICD-10-CM

## 2024-07-11 PROCEDURE — 71046 X-RAY EXAM CHEST 2 VIEWS: CPT

## 2024-08-15 NOTE — PROGRESS NOTES
Neurology Note    Patient ID:  Candis Gibson  [id number]  1950        Subjective: I am here for botox.         History of Present Illness:   Candis Gibson is a pleasant female who returns to the neurology clinic at [facility] for an evaluation.  She received her last botox on 5/17/2024.   Please see my history of present illness, examination, and treatment from that day.  She has a history of cervical dystonia with worsening neck pain, decreased range of motion and headaches.      The patient tolerated her last injection well and had no untoward side effects.  She noticed a significant improvement in her range of motion and her quality of life was improved.  This did begin to wear off over the past few days.  She was pleased with the effectiveness of the injections.   When symptoms return it is predominantly in her posterior cervical paraspinal musculature with tightness, stiffness and decreased range of motion.  Since she was here, she did develop COVID.  She also did develop a fungal infection in her lungs.  She needed to stop all of her immunosuppressant medications through rheumatology.  She is now seeing infectious disease at [facility].  She does feel that she has turned a corner and has slightly improved recently.    Her rheumatoid disease has worsened since she has been off her anti-inflammatory medications  We did not discuss her tremor today    She would like to receive her botulinum toxin injections today    Past Medical History:   Diagnosis Date    Autoimmune disease (HCC)     rheumatoid arthritis    Ill-defined condition     Spondylosis    Osteoarthritis     Skin cancer         Past Surgical History:   Procedure Laterality Date    HX APPENDECTOMY      HX GYN      right ovary and fallopian tube removed    HX KNEE ARTHROSCOPY Left     HX ORTHOPAEDIC      back surgery        Family History   Problem Relation Age of Onset    Cancer Father         Social History     Tobacco Use

## 2024-08-16 ENCOUNTER — OFFICE VISIT (OUTPATIENT)
Age: 74
End: 2024-08-16

## 2024-08-16 ENCOUNTER — HOSPITAL ENCOUNTER (OUTPATIENT)
Age: 74
End: 2024-08-16
Payer: MEDICARE

## 2024-08-16 DIAGNOSIS — G24.3 CERVICAL DYSTONIA: Primary | ICD-10-CM

## 2024-08-16 DIAGNOSIS — B44.1 PULMONARY ASPERGILLOSIS (HCC): ICD-10-CM

## 2024-08-16 DIAGNOSIS — G24.3 SPASMODIC TORTICOLLIS: ICD-10-CM

## 2024-08-16 PROCEDURE — 71046 X-RAY EXAM CHEST 2 VIEWS: CPT

## 2024-09-06 ENCOUNTER — OFFICE VISIT (OUTPATIENT)
Age: 74
End: 2024-09-06

## 2024-09-06 VITALS
HEART RATE: 83 BPM | OXYGEN SATURATION: 97 % | HEIGHT: 64 IN | WEIGHT: 122 LBS | RESPIRATION RATE: 15 BRPM | SYSTOLIC BLOOD PRESSURE: 128 MMHG | BODY MASS INDEX: 20.83 KG/M2 | DIASTOLIC BLOOD PRESSURE: 82 MMHG

## 2024-09-06 DIAGNOSIS — M54.2 NECK PAIN: ICD-10-CM

## 2024-09-06 DIAGNOSIS — G44.209 TENSION HEADACHE: ICD-10-CM

## 2024-09-06 DIAGNOSIS — M06.9 RHEUMATOID ARTHRITIS INVOLVING MULTIPLE SITES, UNSPECIFIED WHETHER RHEUMATOID FACTOR PRESENT (HCC): ICD-10-CM

## 2024-09-06 DIAGNOSIS — M50.30 DDD (DEGENERATIVE DISC DISEASE), CERVICAL: ICD-10-CM

## 2024-09-06 DIAGNOSIS — M54.12 CERVICAL RADICULOPATHY AT C5: ICD-10-CM

## 2024-09-06 DIAGNOSIS — G24.3 SPASMODIC TORTICOLLIS: Primary | ICD-10-CM

## 2024-09-06 RX ORDER — TIZANIDINE 2 MG/1
TABLET ORAL
Qty: 90 TABLET | Refills: 5 | Status: SHIPPED | OUTPATIENT
Start: 2024-09-06

## 2024-09-06 RX ORDER — SODIUM, POTASSIUM,MAG SULFATES 17.5-3.13G
SOLUTION, RECONSTITUTED, ORAL ORAL
COMMUNITY
Start: 2024-09-04

## 2024-09-06 RX ORDER — GUAIFENESIN 600 MG/1
600 TABLET, EXTENDED RELEASE ORAL AS NEEDED
COMMUNITY
Start: 2024-07-23

## 2024-09-06 RX ORDER — PANTOPRAZOLE SODIUM 40 MG/1
40 TABLET, DELAYED RELEASE ORAL DAILY
COMMUNITY
Start: 2024-07-18

## 2024-09-06 RX ORDER — SUCRALFATE 1 G/1
1 TABLET ORAL 2 TIMES DAILY
COMMUNITY
Start: 2024-07-06

## 2024-09-06 RX ORDER — GABAPENTIN 300 MG
300 CAPSULE ORAL
COMMUNITY
Start: 2024-03-07

## 2024-09-06 RX ORDER — ZOLPIDEM TARTRATE 5 MG/1
5 TABLET ORAL DAILY
COMMUNITY

## 2024-09-06 RX ORDER — OMEPRAZOLE 20 MG/1
20 TABLET, DELAYED RELEASE ORAL NIGHTLY
COMMUNITY
End: 2024-09-06

## 2024-09-06 ASSESSMENT — PATIENT HEALTH QUESTIONNAIRE - PHQ9
SUM OF ALL RESPONSES TO PHQ9 QUESTIONS 1 & 2: 0
SUM OF ALL RESPONSES TO PHQ QUESTIONS 1-9: 0
2. FEELING DOWN, DEPRESSED OR HOPELESS: NOT AT ALL
SUM OF ALL RESPONSES TO PHQ QUESTIONS 1-9: 0
1. LITTLE INTEREST OR PLEASURE IN DOING THINGS: NOT AT ALL

## 2024-09-06 NOTE — PROGRESS NOTES
1. Have you been to the ER, urgent care clinic since your last visit?  Hospitalized since your last visit?  No.    2. Have you seen or consulted any other health care providers outside of the [facility] since your last visit?  Include any pap smears or colon screening.   No.      Chief Complaint   Patient presents with    cervical dystonia     Having more headaches       
cervical spine from June 2023 revealed moderate to severe multilevel neuroforaminal narrowing at multiple levels.  Patient saw neurosurgery.  No intervention recommended.      Essential tremor:  Propanolol has not been started yet.       Arthritis  She follows closely with rheumatology.       Patient Active Problem List   Diagnosis Code    PUD (peptic ulcer disease) K27.9    RA (rheumatoid arthritis) ([facility]) M06.9          The patient should return to clinic for regularly scheduled botox injections    Renewed medication: yes    I spent  32  minutes on the day of the encounter preparing the office visit by reviewing medical records, obtaining a history, performing examination, counseling and educating the patient and  on diagnosis, ordering medications, documenting in the clinical medical record, and coordinating the care for the patient.  The patient had the ability to ask questions and all questions were answered.

## 2024-09-16 ENCOUNTER — HOSPITAL ENCOUNTER (OUTPATIENT)
Age: 74
Discharge: HOME | End: 2024-09-19
Payer: MEDICARE

## 2024-09-16 DIAGNOSIS — B44.1 PULMONARY ASPERGILLOSIS (HCC): ICD-10-CM

## 2024-09-16 PROCEDURE — 71046 X-RAY EXAM CHEST 2 VIEWS: CPT

## 2024-09-19 ENCOUNTER — TELEPHONE (OUTPATIENT)
Age: 74
End: 2024-09-19

## 2024-09-30 RX ORDER — TIZANIDINE 2 MG/1
TABLET ORAL
Qty: 150 TABLET | Refills: 5 | Status: SHIPPED | OUTPATIENT
Start: 2024-09-30

## 2024-11-12 NOTE — PROGRESS NOTES
aneurysm.    The urinary bladder is normal.  There is no pelvic mass.    The bony structures are age-appropriate.    Impression  IMPRESSION:  Findings suggestive of gastroenteritis likely attributable to nonspecific  infection or inflammation. There is no bowel obstruction.            Assessment and Plan:      The patient is a pleasant  female with significant neck and shoulder pain with resultant headaches.  Her examination does reveal preserved strength and intact reflexes.  She does have decreased range of motion in her cervical spine and a cervical dystonia      Cervical dystonia:     She has failed multiple oral medications and physical therapy.  She continues to do stretching daily.  Given the severity of symptoms and impact this has had to daily life as well as ineffectiveness of oral medications, botulinum toxin therapy would continue to be indicated for her.  She does have continued positive response to the botulinum toxin     I plan to injection   100  units   Units for the diagnosis of cervical dystonia  Consent performed and placed in medical records  Timeout was performed  All injections were performed under emg guidance unless otherwise indicated  Each vial of botulinum toxin was reconstituted with 2 cc of normal saline     Lot number:  [id number]  Expiration date:  12/26     Procedure:botulinum toxin injections.  EMG guidance was utilized for all injections unless otherwise noted.      Right side:     Muscle Number of units Number of sites Total units injected   Sternocleidomastoid         Spenius Capitus 20 1 20   Levator Scapulae         Scalene 15 1 15   Splenius Cervicus         Longissimus  15 1 15   Trapezius            Left side:      Muscle Number of units Number of sites Total units injected   Sternocleidomastoid         Spenius Capitus         Levator Scapulae 20 1 20   Scalene         Splenius Cervicus         Longissimus 15 1 15   Trapezius 15 1 15         Amount of botulinum toxin

## 2024-11-13 ENCOUNTER — OFFICE VISIT (OUTPATIENT)
Age: 74
End: 2024-11-13
Payer: MEDICARE

## 2024-11-13 DIAGNOSIS — G24.3 SPASMODIC TORTICOLLIS: Primary | ICD-10-CM

## 2024-11-13 PROCEDURE — 64616 CHEMODENERV MUSC NECK DYSTON: CPT | Performed by: PSYCHIATRY & NEUROLOGY

## 2024-11-13 PROCEDURE — 95874 GUIDE NERV DESTR NEEDLE EMG: CPT | Performed by: PSYCHIATRY & NEUROLOGY

## 2024-11-19 RX ORDER — PROPRANOLOL HYDROCHLORIDE 60 MG/1
CAPSULE, EXTENDED RELEASE ORAL DAILY
Qty: 90 CAPSULE | Refills: 1 | Status: SHIPPED | OUTPATIENT
Start: 2024-11-19

## 2024-12-16 NOTE — PROGRESS NOTES
anti-inflammatory sparingly because of prior GI ulcers  She continues with her current dose of gabapentin  Unable to take Cymbalta  Unable to take muscle relaxants.  She can no longer take botulinum toxin due to untoward side effects of weakness even at a low dose  Given her headaches, I will increase her propranolol.  This can also help her tremor more.  Will increase to propranolol 80 mg a day.  She will contact me in 2 to 4 weeks.  Other medications that will need to be considered include Nurtec.      Cervical dystonia:    She has failed multiple oral medications and physical therapy.  She continues to do stretching daily.  Botulinum toxin no longer providing relief  Continue with anti-inflammatories intermittently  Patient cannot tolerate muscle relaxants at a low dose      Cervical degenerative spine disease:  Follow-up MRI of the cervical spine from June 2023 revealed moderate to severe multilevel neuroforaminal narrowing at multiple levels.  Patient saw neurosurgery.  No intervention recommended.      Essential tremor:  Will increase propranolol to 80 mg a day.    Side effects and toxicity were reviewed       Arthritis  She follows closely with rheumatology.       Patient Active Problem List   Diagnosis Code    PUD (peptic ulcer disease) K27.9    RA (rheumatoid arthritis) ([facility]) M06.9          The patient should return to clinic for scheduled appt.  She will contact me in about 2 weeks about her symptoms are doing.   Renewed medication: yes    I spent  30  minutes on the day of the encounter preparing the office visit by reviewing medical records, obtaining a history, performing examination, counseling and educating the patient and  on diagnosis, ordering medications, documenting in the clinical medical record, and coordinating the care for the patient.  The patient had the ability to ask questions and all questions were answered.

## 2024-12-18 ENCOUNTER — TELEMEDICINE (OUTPATIENT)
Age: 74
End: 2024-12-18
Payer: MEDICARE

## 2024-12-18 DIAGNOSIS — M54.12 CERVICAL RADICULOPATHY AT C5: ICD-10-CM

## 2024-12-18 DIAGNOSIS — M54.2 NECK PAIN: ICD-10-CM

## 2024-12-18 DIAGNOSIS — M50.30 DDD (DEGENERATIVE DISC DISEASE), CERVICAL: ICD-10-CM

## 2024-12-18 DIAGNOSIS — M06.9 RHEUMATOID ARTHRITIS INVOLVING MULTIPLE SITES, UNSPECIFIED WHETHER RHEUMATOID FACTOR PRESENT (HCC): ICD-10-CM

## 2024-12-18 DIAGNOSIS — G44.209 TENSION HEADACHE: ICD-10-CM

## 2024-12-18 DIAGNOSIS — G24.3 SPASMODIC TORTICOLLIS: Primary | ICD-10-CM

## 2024-12-18 PROCEDURE — 99214 OFFICE O/P EST MOD 30 MIN: CPT | Performed by: PSYCHIATRY & NEUROLOGY

## 2024-12-18 PROCEDURE — G8427 DOCREV CUR MEDS BY ELIG CLIN: HCPCS | Performed by: PSYCHIATRY & NEUROLOGY

## 2024-12-18 PROCEDURE — G8484 FLU IMMUNIZE NO ADMIN: HCPCS | Performed by: PSYCHIATRY & NEUROLOGY

## 2024-12-18 PROCEDURE — 1160F RVW MEDS BY RX/DR IN RCRD: CPT | Performed by: PSYCHIATRY & NEUROLOGY

## 2024-12-18 PROCEDURE — G8400 PT W/DXA NO RESULTS DOC: HCPCS | Performed by: PSYCHIATRY & NEUROLOGY

## 2024-12-18 PROCEDURE — 1123F ACP DISCUSS/DSCN MKR DOCD: CPT | Performed by: PSYCHIATRY & NEUROLOGY

## 2024-12-18 PROCEDURE — G8420 CALC BMI NORM PARAMETERS: HCPCS | Performed by: PSYCHIATRY & NEUROLOGY

## 2024-12-18 PROCEDURE — 1090F PRES/ABSN URINE INCON ASSESS: CPT | Performed by: PSYCHIATRY & NEUROLOGY

## 2024-12-18 PROCEDURE — 1159F MED LIST DOCD IN RCRD: CPT | Performed by: PSYCHIATRY & NEUROLOGY

## 2024-12-18 PROCEDURE — 1036F TOBACCO NON-USER: CPT | Performed by: PSYCHIATRY & NEUROLOGY

## 2024-12-18 PROCEDURE — 3017F COLORECTAL CA SCREEN DOC REV: CPT | Performed by: PSYCHIATRY & NEUROLOGY

## 2024-12-18 RX ORDER — PREDNISONE 5 MG/1
5 TABLET ORAL DAILY
COMMUNITY

## 2024-12-18 RX ORDER — LORAZEPAM 1 MG/1
2 TABLET ORAL DAILY
COMMUNITY
Start: 2024-12-05

## 2024-12-18 RX ORDER — PROPRANOLOL HYDROCHLORIDE 80 MG/1
80 CAPSULE, EXTENDED RELEASE ORAL DAILY
Qty: 30 CAPSULE | Refills: 3 | Status: SHIPPED | OUTPATIENT
Start: 2024-12-18

## 2024-12-18 ASSESSMENT — ANXIETY QUESTIONNAIRES
3. WORRYING TOO MUCH ABOUT DIFFERENT THINGS: NOT AT ALL
6. BECOMING EASILY ANNOYED OR IRRITABLE: NOT AT ALL
5. BEING SO RESTLESS THAT IT IS HARD TO SIT STILL: NOT AT ALL
GAD7 TOTAL SCORE: 0
4. TROUBLE RELAXING: NOT AT ALL
1. FEELING NERVOUS, ANXIOUS, OR ON EDGE: NOT AT ALL
2. NOT BEING ABLE TO STOP OR CONTROL WORRYING: NOT AT ALL
7. FEELING AFRAID AS IF SOMETHING AWFUL MIGHT HAPPEN: NOT AT ALL

## 2024-12-18 ASSESSMENT — PATIENT HEALTH QUESTIONNAIRE - PHQ9
SUM OF ALL RESPONSES TO PHQ9 QUESTIONS 1 & 2: 0
SUM OF ALL RESPONSES TO PHQ QUESTIONS 1-9: 0
1. LITTLE INTEREST OR PLEASURE IN DOING THINGS: NOT AT ALL
SUM OF ALL RESPONSES TO PHQ QUESTIONS 1-9: 0
2. FEELING DOWN, DEPRESSED OR HOPELESS: NOT AT ALL

## 2025-02-26 ENCOUNTER — PATIENT MESSAGE (OUTPATIENT)
Age: 75
End: 2025-02-26

## 2025-02-26 ENCOUNTER — TELEPHONE (OUTPATIENT)
Age: 75
End: 2025-02-26

## 2025-02-26 NOTE — TELEPHONE ENCOUNTER
Patient is asking to be worked in to see Dr. Fang. She states she thought she still had her appt for today, even though she no longer receives botox. Please call her back at . Thank you.

## 2025-02-27 ENCOUNTER — TELEPHONE (OUTPATIENT)
Age: 75
End: 2025-02-27

## 2025-02-27 NOTE — TELEPHONE ENCOUNTER
See note in telephone encounter. Pt has been scheduled 2/28/25 at 4 pm.   Will close out this encounter.

## 2025-02-27 NOTE — TELEPHONE ENCOUNTER
Joss Fang, DO  You15 hours ago (5:09 PM)       Ok.  Please call the patient.     You  Joss Fang, DO22 hours ago (9:51 AM)     DR  Her appt was canceled, not sure what happened. Can you see her Friday at 4 pm? Thanks.

## 2025-02-27 NOTE — TELEPHONE ENCOUNTER
Called pt,verified pt with two pt identifiers, apologized to pt for the confusion with the appt. Advised not sure how it was canceled. We do have some availability tomorrow with Annalisa if she is available. Pt in agreement. Scheduled appt for 2/28/25 at 4 pm with . pt verbalized understanding.

## 2025-02-28 ENCOUNTER — OFFICE VISIT (OUTPATIENT)
Age: 75
End: 2025-02-28
Payer: MEDICARE

## 2025-02-28 VITALS
SYSTOLIC BLOOD PRESSURE: 120 MMHG | RESPIRATION RATE: 14 BRPM | HEIGHT: 64 IN | HEART RATE: 76 BPM | OXYGEN SATURATION: 98 % | DIASTOLIC BLOOD PRESSURE: 80 MMHG | WEIGHT: 120 LBS | BODY MASS INDEX: 20.49 KG/M2

## 2025-02-28 DIAGNOSIS — G44.209 TENSION HEADACHE: ICD-10-CM

## 2025-02-28 DIAGNOSIS — M06.9 RHEUMATOID ARTHRITIS INVOLVING MULTIPLE SITES, UNSPECIFIED WHETHER RHEUMATOID FACTOR PRESENT (HCC): ICD-10-CM

## 2025-02-28 DIAGNOSIS — M54.12 CERVICAL RADICULOPATHY AT C5: Primary | ICD-10-CM

## 2025-02-28 DIAGNOSIS — G43.719 INTRACTABLE CHRONIC MIGRAINE WITHOUT AURA AND WITHOUT STATUS MIGRAINOSUS: ICD-10-CM

## 2025-02-28 DIAGNOSIS — G24.3 CERVICAL DYSTONIA: ICD-10-CM

## 2025-02-28 PROCEDURE — 1090F PRES/ABSN URINE INCON ASSESS: CPT | Performed by: PSYCHIATRY & NEUROLOGY

## 2025-02-28 PROCEDURE — G8427 DOCREV CUR MEDS BY ELIG CLIN: HCPCS | Performed by: PSYCHIATRY & NEUROLOGY

## 2025-02-28 PROCEDURE — 1159F MED LIST DOCD IN RCRD: CPT | Performed by: PSYCHIATRY & NEUROLOGY

## 2025-02-28 PROCEDURE — 1123F ACP DISCUSS/DSCN MKR DOCD: CPT | Performed by: PSYCHIATRY & NEUROLOGY

## 2025-02-28 PROCEDURE — 1160F RVW MEDS BY RX/DR IN RCRD: CPT | Performed by: PSYCHIATRY & NEUROLOGY

## 2025-02-28 PROCEDURE — 99214 OFFICE O/P EST MOD 30 MIN: CPT | Performed by: PSYCHIATRY & NEUROLOGY

## 2025-02-28 PROCEDURE — 1036F TOBACCO NON-USER: CPT | Performed by: PSYCHIATRY & NEUROLOGY

## 2025-02-28 PROCEDURE — 1126F AMNT PAIN NOTED NONE PRSNT: CPT | Performed by: PSYCHIATRY & NEUROLOGY

## 2025-02-28 PROCEDURE — G8400 PT W/DXA NO RESULTS DOC: HCPCS | Performed by: PSYCHIATRY & NEUROLOGY

## 2025-02-28 PROCEDURE — 3017F COLORECTAL CA SCREEN DOC REV: CPT | Performed by: PSYCHIATRY & NEUROLOGY

## 2025-02-28 PROCEDURE — G8420 CALC BMI NORM PARAMETERS: HCPCS | Performed by: PSYCHIATRY & NEUROLOGY

## 2025-02-28 RX ORDER — OMEPRAZOLE 20 MG/1
20 CAPSULE, DELAYED RELEASE ORAL DAILY
COMMUNITY

## 2025-02-28 RX ORDER — RIMEGEPANT SULFATE 75 MG/75MG
75 TABLET, ORALLY DISINTEGRATING ORAL EVERY OTHER DAY
Qty: 16 TABLET | Refills: 5 | Status: SHIPPED | OUTPATIENT
Start: 2025-02-28

## 2025-02-28 RX ORDER — GABAPENTIN 600 MG/1
600 TABLET ORAL
COMMUNITY

## 2025-02-28 ASSESSMENT — PATIENT HEALTH QUESTIONNAIRE - PHQ9
SUM OF ALL RESPONSES TO PHQ QUESTIONS 1-9: 0
2. FEELING DOWN, DEPRESSED OR HOPELESS: NOT AT ALL
SUM OF ALL RESPONSES TO PHQ QUESTIONS 1-9: 0
SUM OF ALL RESPONSES TO PHQ9 QUESTIONS 1 & 2: 0
1. LITTLE INTEREST OR PLEASURE IN DOING THINGS: NOT AT ALL

## 2025-02-28 NOTE — PROGRESS NOTES
1. Have you been to the ER, urgent care clinic since your last visit?  Hospitalized since your last visit?  No.    2. Have you seen or consulted any other health care providers outside of the [facility] since your last visit?  Include any pap smears or colon screening.   No.      Chief Complaint   Patient presents with    Headaches       
negative  Allergy/immunology: negative    Objective:     Vitals:    25 1559   BP: 120/80   Pulse: 76   Resp: 14   SpO2: 98%       Physical Exam:    General:  appears well nourished in no acute distress, but in obvious discomfort  Lungs: no labored breathing.   Skin: intact    Neurological exam:    Awake, alert, oriented to person, place and time  Recent and remote memory were normal  Attention and concentration were intact  Language was intact.  There was no aphasia  Speech: no dysarthria  Fund of knowledge was preserved    Cranial nerves:   II-XII were tested    Visual fields were full  Eomi, no evidence of nystagmus  Facial motor: normal and symmetric  Hearing intact  SCM strength intact  Tongue: midline without fasciculations    Motor:   No pronator drift  left shoulder lift. She does have decreased range of motion in her neck in all spheres, but notable decreased right rotation and decrease head tilt to the left.  She does have a right laterocollis and right rotation.  She also does have quite taut cervical paraspinal musculatures.   She does have tenderness over her greater occipital notch.  She has significant tenderness on her cervical paraspinal musculature with decreased range of motion.    No evidence of fasciculations    Strength testin out of 5 throughout  No focal weakness.   Pain does impact her ability to sustain a contraction in her lower extremities.    There was no resting tremor or action tremor today.  Muscle better on propanolol  Sensation was intact in her upper extremities  Decrease to pp in the left lower extremity  Reflexes were 2+ at her biceps, triceps and brachioradialis and patella.  Slightly decreased achilles reflex    Gait: steady.     Labs:     Lab Results   Component Value Date/Time    Sodium 139 2017 04:06 AM    Potassium 3.3 (L) 2017 04:06 AM    Chloride 107 2017 04:06 AM    Glucose 90 2017 04:06 AM    BUN 6 2017 04:06 AM    Creatinine 0.61

## 2025-03-10 RX ORDER — PROPRANOLOL HYDROCHLORIDE 80 MG/1
CAPSULE, EXTENDED RELEASE ORAL DAILY
Qty: 90 CAPSULE | Refills: 1 | Status: SHIPPED | OUTPATIENT
Start: 2025-03-10

## 2025-04-09 ENCOUNTER — TRANSCRIBE ORDERS (OUTPATIENT)
Facility: HOSPITAL | Age: 75
End: 2025-04-09

## 2025-04-09 DIAGNOSIS — M54.16 LUMBAR RADICULOPATHY: Primary | ICD-10-CM

## 2025-04-10 ENCOUNTER — HOSPITAL ENCOUNTER (OUTPATIENT)
Age: 75
Discharge: HOME | End: 2025-04-13
Attending: ORTHOPAEDIC SURGERY
Payer: MEDICARE

## 2025-04-10 DIAGNOSIS — M54.16 LUMBAR RADICULOPATHY: ICD-10-CM

## 2025-04-10 PROCEDURE — 72148 MRI LUMBAR SPINE W/O DYE: CPT

## 2025-05-14 RX ORDER — METHYLPREDNISOLONE 4 MG/1
TABLET ORAL
Qty: 1 KIT | Refills: 0 | Status: SHIPPED | OUTPATIENT
Start: 2025-05-14 | End: 2025-05-20

## 2025-05-22 ENCOUNTER — HOSPITAL ENCOUNTER (OUTPATIENT)
Age: 75
Discharge: HOME | End: 2025-05-25
Payer: MEDICARE

## 2025-05-22 DIAGNOSIS — R05.1 ACUTE COUGH: ICD-10-CM

## 2025-05-22 PROCEDURE — 71046 X-RAY EXAM CHEST 2 VIEWS: CPT

## 2025-07-22 RX ORDER — PROPRANOLOL HYDROCHLORIDE 80 MG/1
CAPSULE, EXTENDED RELEASE ORAL DAILY
Qty: 90 CAPSULE | Refills: 1 | Status: SHIPPED | OUTPATIENT
Start: 2025-07-22

## 2025-08-20 ENCOUNTER — OFFICE VISIT (OUTPATIENT)
Age: 75
End: 2025-08-20
Payer: MEDICARE

## 2025-08-20 VITALS
DIASTOLIC BLOOD PRESSURE: 66 MMHG | OXYGEN SATURATION: 99 % | HEART RATE: 65 BPM | SYSTOLIC BLOOD PRESSURE: 110 MMHG | RESPIRATION RATE: 18 BRPM

## 2025-08-20 DIAGNOSIS — M54.2 NECK PAIN: ICD-10-CM

## 2025-08-20 DIAGNOSIS — G44.209 TENSION HEADACHE: ICD-10-CM

## 2025-08-20 DIAGNOSIS — G24.3 SPASMODIC TORTICOLLIS: ICD-10-CM

## 2025-08-20 DIAGNOSIS — G43.719 INTRACTABLE CHRONIC MIGRAINE WITHOUT AURA AND WITHOUT STATUS MIGRAINOSUS: Primary | ICD-10-CM

## 2025-08-20 DIAGNOSIS — M54.12 CERVICAL RADICULOPATHY AT C5: ICD-10-CM

## 2025-08-20 PROCEDURE — 1160F RVW MEDS BY RX/DR IN RCRD: CPT | Performed by: PSYCHIATRY & NEUROLOGY

## 2025-08-20 PROCEDURE — 1123F ACP DISCUSS/DSCN MKR DOCD: CPT | Performed by: PSYCHIATRY & NEUROLOGY

## 2025-08-20 PROCEDURE — 1125F AMNT PAIN NOTED PAIN PRSNT: CPT | Performed by: PSYCHIATRY & NEUROLOGY

## 2025-08-20 PROCEDURE — 1036F TOBACCO NON-USER: CPT | Performed by: PSYCHIATRY & NEUROLOGY

## 2025-08-20 PROCEDURE — 1090F PRES/ABSN URINE INCON ASSESS: CPT | Performed by: PSYCHIATRY & NEUROLOGY

## 2025-08-20 PROCEDURE — G8400 PT W/DXA NO RESULTS DOC: HCPCS | Performed by: PSYCHIATRY & NEUROLOGY

## 2025-08-20 PROCEDURE — G8427 DOCREV CUR MEDS BY ELIG CLIN: HCPCS | Performed by: PSYCHIATRY & NEUROLOGY

## 2025-08-20 PROCEDURE — G8420 CALC BMI NORM PARAMETERS: HCPCS | Performed by: PSYCHIATRY & NEUROLOGY

## 2025-08-20 PROCEDURE — 1159F MED LIST DOCD IN RCRD: CPT | Performed by: PSYCHIATRY & NEUROLOGY

## 2025-08-20 PROCEDURE — 99214 OFFICE O/P EST MOD 30 MIN: CPT | Performed by: PSYCHIATRY & NEUROLOGY

## 2025-08-20 PROCEDURE — 3017F COLORECTAL CA SCREEN DOC REV: CPT | Performed by: PSYCHIATRY & NEUROLOGY

## 2025-08-20 RX ORDER — PANTOPRAZOLE SODIUM 40 MG/1
40 TABLET, DELAYED RELEASE ORAL DAILY
COMMUNITY

## 2025-08-20 RX ORDER — UPADACITINIB 15 MG/1
1 TABLET, EXTENDED RELEASE ORAL DAILY
COMMUNITY

## 2025-08-20 RX ORDER — DULOXETIN HYDROCHLORIDE 20 MG/1
40 CAPSULE, DELAYED RELEASE ORAL DAILY
COMMUNITY
Start: 2025-07-21

## 2025-08-20 RX ORDER — CYANOCOBALAMIN 1000 UG/ML
1000 INJECTION, SOLUTION INTRAMUSCULAR; SUBCUTANEOUS
COMMUNITY
Start: 2025-07-09

## 2025-08-20 ASSESSMENT — PATIENT HEALTH QUESTIONNAIRE - PHQ9
1. LITTLE INTEREST OR PLEASURE IN DOING THINGS: NOT AT ALL
SUM OF ALL RESPONSES TO PHQ QUESTIONS 1-9: 0
SUM OF ALL RESPONSES TO PHQ QUESTIONS 1-9: 0
2. FEELING DOWN, DEPRESSED OR HOPELESS: NOT AT ALL
SUM OF ALL RESPONSES TO PHQ QUESTIONS 1-9: 0
SUM OF ALL RESPONSES TO PHQ QUESTIONS 1-9: 0

## (undated) DEVICE — ENDO CARRY-ON PROCEDURE KIT INCLUDES ENZYMATIC SPONGE, GAUZE, BIOHAZARD LABEL, TRAY, LUBRICANT, DIRTY SCOPE LABEL, WATER LABEL, TRAY, DRAWSTRING PAD, AND DEFENDO 4-PIECE KIT.: Brand: ENDO CARRY-ON PROCEDURE KIT

## (undated) DEVICE — KENDALL RADIOLUCENT FOAM MONITORING ELECTRODE -RECTANGULAR SHAPE: Brand: KENDALL

## (undated) DEVICE — SOLIDIFIER FLUID 3000 CC ABSORB

## (undated) DEVICE — BAG BELONG PT PERS CLEAR HANDL

## (undated) DEVICE — BW-412T DISP COMBO CLEANING BRUSH: Brand: SINGLE USE COMBINATION CLEANING BRUSH

## (undated) DEVICE — 1200 GUARD II KIT W/5MM TUBE W/O VAC TUBE: Brand: GUARDIAN

## (undated) DEVICE — FORCEPS BX L240CM JAW DIA2.8MM L CAP W/ NDL MIC MESH TOOTH

## (undated) DEVICE — SET ADMIN 16ML TBNG L100IN 2 Y INJ SITE IV PIGGY BK DISP

## (undated) DEVICE — CANN NASAL O2 CAPNOGRAPHY AD -- FILTERLINE

## (undated) DEVICE — CONTAINER SPEC 20 ML LID NEUT BUFF FORMALIN 10 % POLYPR STS

## (undated) DEVICE — NEEDLE HYPO 18GA L1.5IN PNK S STL HUB POLYPR SHLD REG BVL

## (undated) DEVICE — BAG SPEC BIOHZD LF 2MIL 6X10IN -- CONVERT TO ITEM 357326

## (undated) DEVICE — SYRINGE MED 20ML STD CLR PLAS LUERLOCK TIP N CTRL DISP

## (undated) DEVICE — SET EXTN TBNG L BOR 4 W STPCOCK ST 32IN PRIMING VOL 6ML

## (undated) DEVICE — BITE BLK ENDOSCP AD 54FR GRN POLYETH ENDOSCP W STRP SLD

## (undated) DEVICE — Device

## (undated) DEVICE — NEONATAL-ADULT SPO2 SENSOR: Brand: NELLCOR